# Patient Record
Sex: FEMALE | Race: WHITE | URBAN - METROPOLITAN AREA
[De-identification: names, ages, dates, MRNs, and addresses within clinical notes are randomized per-mention and may not be internally consistent; named-entity substitution may affect disease eponyms.]

---

## 2017-06-20 ENCOUNTER — HOSPITAL ENCOUNTER (OUTPATIENT)
Age: 72
Setting detail: SPECIMEN
Discharge: HOME | End: 2017-06-20
Payer: MEDICARE

## 2017-06-22 LAB — SURGICAL PATHOLOGY REPORT: NORMAL

## 2018-05-31 PROBLEM — M76.62 ACHILLES TENDINITIS OF LEFT LOWER EXTREMITY: Status: ACTIVE | Noted: 2018-05-31

## 2018-05-31 PROBLEM — M77.52: Status: ACTIVE | Noted: 2018-05-31

## 2019-05-30 ENCOUNTER — OFFICE VISIT (OUTPATIENT)
Dept: PRIMARY CARE CLINIC | Age: 74
End: 2019-05-30
Payer: MEDICARE

## 2019-05-30 VITALS
WEIGHT: 141.2 LBS | SYSTOLIC BLOOD PRESSURE: 112 MMHG | BODY MASS INDEX: 26.66 KG/M2 | DIASTOLIC BLOOD PRESSURE: 84 MMHG | OXYGEN SATURATION: 97 % | HEART RATE: 66 BPM | HEIGHT: 61 IN

## 2019-05-30 DIAGNOSIS — I10 ESSENTIAL (PRIMARY) HYPERTENSION: Primary | ICD-10-CM

## 2019-05-30 DIAGNOSIS — E78.2 MIXED HYPERLIPIDEMIA: ICD-10-CM

## 2019-05-30 PROCEDURE — 1036F TOBACCO NON-USER: CPT | Performed by: FAMILY MEDICINE

## 2019-05-30 PROCEDURE — 99214 OFFICE O/P EST MOD 30 MIN: CPT | Performed by: FAMILY MEDICINE

## 2019-05-30 PROCEDURE — G8427 DOCREV CUR MEDS BY ELIG CLIN: HCPCS | Performed by: FAMILY MEDICINE

## 2019-05-30 PROCEDURE — 1123F ACP DISCUSS/DSCN MKR DOCD: CPT | Performed by: FAMILY MEDICINE

## 2019-05-30 PROCEDURE — 4040F PNEUMOC VAC/ADMIN/RCVD: CPT | Performed by: FAMILY MEDICINE

## 2019-05-30 PROCEDURE — 1090F PRES/ABSN URINE INCON ASSESS: CPT | Performed by: FAMILY MEDICINE

## 2019-05-30 PROCEDURE — G8400 PT W/DXA NO RESULTS DOC: HCPCS | Performed by: FAMILY MEDICINE

## 2019-05-30 PROCEDURE — 3017F COLORECTAL CA SCREEN DOC REV: CPT | Performed by: FAMILY MEDICINE

## 2019-05-30 PROCEDURE — G8419 CALC BMI OUT NRM PARAM NOF/U: HCPCS | Performed by: FAMILY MEDICINE

## 2019-05-30 RX ORDER — ACETAMINOPHEN 325 MG/1
650 TABLET ORAL
COMMUNITY

## 2019-05-30 RX ORDER — AMLODIPINE BESYLATE 5 MG/1
5 TABLET ORAL DAILY
Qty: 90 TABLET | Refills: 1 | Status: SHIPPED | OUTPATIENT
Start: 2019-05-30

## 2019-05-30 ASSESSMENT — PATIENT HEALTH QUESTIONNAIRE - PHQ9
SUM OF ALL RESPONSES TO PHQ QUESTIONS 1-9: 0
1. LITTLE INTEREST OR PLEASURE IN DOING THINGS: 0
2. FEELING DOWN, DEPRESSED OR HOPELESS: 0
SUM OF ALL RESPONSES TO PHQ QUESTIONS 1-9: 0
SUM OF ALL RESPONSES TO PHQ9 QUESTIONS 1 & 2: 0

## 2019-05-30 ASSESSMENT — ENCOUNTER SYMPTOMS: SHORTNESS OF BREATH: 0

## 2019-05-30 NOTE — PROGRESS NOTES
Malgorzata Hoover a 68 y.o. female who presents today for her medical conditions/complaints as notedbelow. Chief Complaint   Patient presents with    Hypertension    Medication Refill         HPI:   Hypertension   This is a chronic problem. The current episode started more than 1 year ago. The problem is unchanged. The problem is controlled. Pertinent negatives include no chest pain, palpitations or shortness of breath. There are no associated agents to hypertension. Risk factors for coronary artery disease include post-menopausal state. Past treatments include calcium channel blockers. The current treatment provides significant improvement. There are no compliance problems. There is no history of CAD/MI. LDL Calculated (mg/dL)   Date Value   06/04/2018 65   05/04/2017 73       (goal LDL is <100)   AST (U/L)   Date Value   03/28/2014 17     ALT (U/L)   Date Value   03/28/2014 14     BUN (mg/dL)   Date Value   09/22/2016 15     BP Readings from Last 3 Encounters:   05/30/19 112/84   10/30/18 116/88   05/31/18 100/70          (goal 120/80)    Past Medical History:   Diagnosis Date    Colon cancer ([facility] [address]     Hyperlipidemia     Hypertension     Skin cancer     nose    Small bowel obstruction (HCC)     Squamous cell cancer of skin of nose 2013      Past Surgical History:   Procedure Laterality Date    BLADDER SUSPENSION      BREAST BIOPSY      CERVICAL DISCECTOMY      c-6    CERVICAL LAMINECTOMY      c-7    COLECTOMY      COLON SURGERY      resection r/t CA    CORONARY ANGIOPLASTY  09/2016    DILATION AND CURETTAGE OF UTERUS      DILATION AND CURETTAGE OF UTERUS  1979    GASTRIC STIMULATOR IMPLANT SURGERY  05/14/2016    CC for rectal incontinence    HYSTERECTOMY      RECTOCELE REPAIR      ISIDRO AND BSO  1990    TUBAL LIGATION Bilateral     VAGINA SURGERY      vault suspension       No family history on file.     Social History     Tobacco Use    Smoking status: Never Smoker    Smokeless tobacco: Never Used   Substance Use Topics    Alcohol use: Not on file      Current Outpatient Medications   Medication Sig Dispense Refill    acetaminophen (TYLENOL) 325 MG tablet Take 650 mg by mouth      amLODIPine (NORVASC) 5 MG tablet Take 1 tablet by mouth daily 90 tablet 1    omeprazole (PRILOSEC) 40 MG delayed release capsule TAKE 1 CAPSULE DAILY 90 capsule 3    simvastatin (ZOCOR) 40 MG tablet TAKE 1 TABLET NIGHTLY 90 tablet 3    meloxicam (MOBIC) 15 MG tablet TAKE 1 TABLET DAILY 90 tablet 3    aspirin 81 MG tablet Take 81 mg by mouth daily      Calcium Carbonate-Vitamin D (CALCIUM + D PO) Take 600 mg by mouth.  Cholecalciferol (D3 ADULT PO) Take  by mouth.  tiZANidine (ZANAFLEX) 2 MG tablet Take by mouth every 8 hours as needed        No current facility-administered medications for this visit. No Known Allergies    Health Maintenance   Topic Date Due    Hepatitis C screen  1945    Shingles Vaccine (2 of 3) 03/11/2011    Colon Cancer Screen FIT/FOBT  08/16/2018    Potassium monitoring  06/04/2019    Creatinine monitoring  06/04/2019    Breast cancer screen  09/26/2020    DTaP/Tdap/Td vaccine (2 - Td) 12/19/2022    Lipid screen  06/04/2023    DEXA (modify frequency per FRAX score)  Completed    Flu vaccine  Completed    Pneumococcal 65+ years Vaccine  Completed       Subjective:      Review of Systems   Respiratory: Negative for shortness of breath. Cardiovascular: Negative for chest pain and palpitations. Neurological: Negative for dizziness and light-headedness. Objective:     /84   Pulse 66   Ht 5' 0.5\" (1.537 m)   Wt 141 lb 3.2 oz (64 kg)   SpO2 97%   BMI 27.12 kg/m²   Physical Exam   Constitutional: She is oriented to person, place, and time. She appears well-developed and well-nourished. No distress. HENT:   Head: Normocephalic and atraumatic.    Right Ear: External ear normal.   Left Ear: External ear normal.   Mouth/Throat: Oropharynx is clear and moist.   Eyes: Pupils are equal, round, and reactive to light. Conjunctivae are normal. Right eye exhibits no discharge. Left eye exhibits no discharge. No scleral icterus. Neck: No tracheal deviation present. No thyromegaly present. Cardiovascular: Normal rate, regular rhythm, normal heart sounds and intact distal pulses. No murmur heard. No carotid bruits   Pulmonary/Chest: Effort normal and breath sounds normal. No respiratory distress. She has no wheezes. Musculoskeletal: She exhibits no edema. Lymphadenopathy:     She has no cervical adenopathy. Neurological: She is alert and oriented to person, place, and time. Skin: Skin is warm. Capillary refill takes less than 2 seconds. No rash noted. Psychiatric: She has a normal mood and affect. Her behavior is normal. Thought content normal.   Nursing note and vitals reviewed. Assessment:       Diagnosis Orders   1. Essential (primary) hypertension     2. Mixed hyperlipidemia          Plan:      Return if symptoms worsen or fail to improve. Due for labs   Needs to find a new doctor in [address]  Patient given her main infor and last labs. No orders of the defined types were placed in this encounter. Orders Placed This Encounter   Medications    amLODIPine (NORVASC) 5 MG tablet     Sig: Take 1 tablet by mouth daily     Dispense:  90 tablet     Refill:  1       Patient given educational materials - see patient instructions. Discussed use, benefit, and side effects of prescribed medications. All patient questions answered. Pt voiced understanding. Reviewed health maintenance. Instructed to continue current medications, diet and exercise. Patient agreed with treatment plan. Follow up as directed.      Electronicallysigned by Omar Xie MD on 5/30/2019 at 11:57 AM

## 2020-08-25 ENCOUNTER — HOSPITAL ENCOUNTER (OUTPATIENT)
Dept: PHYSICAL THERAPY | Age: 75
Discharge: HOME | End: 2020-08-25
Payer: MEDICARE

## 2020-08-25 PROCEDURE — 97161 PT EVAL LOW COMPLEX 20 MIN: CPT

## 2020-08-25 PROCEDURE — 97110 THERAPEUTIC EXERCISES: CPT

## 2020-08-25 NOTE — THERAPY EVALUATION
Karuna Bull  : 1945  [place]  Secondary: [address] [facility]  [address] , [address], [place] [address]  [phone]   [phone]        OUTPATIENT PHYSICAL THERAPY:Initial Assessment 2020   ICD-10: Treatment Diagnosis: R15.9 Full incontinence of feces (Fecal incontinence NOS), R27.8 Lack of coordination (muscle incoordination), R39.11 Hesitancy of micturition, M62.81 Generalized weakness  Precautions/Allergies:   Patient has no allergy information on record. TREATMENT PLAN:  Effective Dates: 2020 TO 2020 (90 days). Frequency/Duration: 1 time a week for 90 Day(s) MEDICAL/REFERRING DIAGNOSIS:  Pelvic Floor   DATE OF ONSET: chronic  REFERRING PHYSICIAN: Julia Hart MD MD Orders: Evaluate and treat  Return MD Appointment: 2021     INITIAL ASSESSMENT:  Ms. Ariane Hercules presents with both weak and overactive pelvic floor muscle (PFM), resulting in fecal incontinence. She also demonstrates weakness throughout her core musculature and pelvic girdle which could be contributing to further PFM dysfunction. She has a good coordination of contraction, relaxation and bulge of PFM, however sensory awareness with full rectum is decreased. This could indicate a defect of the internal anal sphincter contributing to [address] as well. I believe she will benefit from skilled PT, with an emphasis on manual therapy and muscle retraining, strength, and coordination to improve her bowel control. She is pleasant, motivated and eager to return to her prior level of function. PROBLEM LIST (Impacting functional limitations):  1. Decreased Strength  2. Decreased ADL/Functional Activities  3. Decreased [place] with Home Exercise Program  4. Decreased timing, awareness and coordination   5. Decreased sensory awareness INTERVENTIONS PLANNED: (Treatment may consist of any combination of the following)  1.  Neuromuscular re-education  2. Biofeedback as needed  3. HEP  4. Bladder retraining  5. Bladder education  6. Electrical Stimulation  7. Home Exercise Program (HEP)  8. Manual Therapy  9. Neuromuscular Re-education/Strengthening  10. Therapeutic Activites  11. Therapeutic Exercise/Strengthening     GOALS: (Goals have been discussed and agreed upon with patient.)  Short-Term Functional Goals: Time Frame: 6 weeks  1. Pt will demonstrate I with basic PFM HEP to improve awareness, coordination, and timing of PFM. 2. Pt will independently perform proper toilet position to improve bowel and bladder emptying. 3. Pt will be independent with a bowel routine in order to improve bowel regularity. Discharge Goals: Time Frame: 12 weeks  1. Pt will be reports a reduction in episodes of FI to < 2-3 per week. 2. Pt will increased external anal sphincter squeeze pressure from 1/5 to 3/5 in order to reduce pad usage by 1-2 PPD. 3. Pt will improve outcome score by 10%. 4. Pt will demonstrate I with advanced core stabilization and general mobility program to facilitate carry over and I management of symptoms. OUTCOME MEASURE:   Tool Used: Pelvic Floor Impact Questionnaire--short form 7 (PFIQ-7)   Score:  Initial: 8/25/20  · Bladder or Urine: 0/100  · Bowel or Rectum: 33/100  · Vagina or Pelvis: 0/100 Most Recent: X (Date: -- )  · Bladder or Urine: X  · Bowel or Rectum: X  · Vagina or Pelvis: X   Interpretation of Score: Each of the 7 sections is scored on a scale from 0-3; 0 representing \"Not at all\", 3 representing \"Quite a bit\". The mean value is taken from all the answered items, then multiplied by 100 to obtain the scale score, ranging from 0-100. This process is repeated for each column representing bowel, bladder, and pelvic pain.     MEDICAL NECESSITY:   · Patient is expected to demonstrate progress in strength, range of motion, coordination and functional technique to improve bowel control and reduce FI.  REASON FOR SERVICES/OTHER COMMENTS:  · Patient continues to require skilled intervention due to above mentioned deficits. Total Duration:  PT Patient Time In/Time Out  Time In: 1500  Time Out: 1600    Rehabilitation Potential For Stated Goals: [facility], I certify that the treatment plan above will be carried out by a therapist or under their direction. Thank you for this referral,  Martin Morelos, PT, DPT     Referring Physician Signature: Katheryn Stout MD _______________________________ Date _____________     PAIN/SUBJECTIVE:   Initial: Pain Intensity 1: 0  Post Session:  0/10   HISTORY:   History of Injury/Illness (Reason for Referral):  Ms. Amie Amaya is a 76 yo female referred to pelvic floor physical therapy (PFPT) by Katheryn Stout MD 2/2 fecal incontinence. This is a chronic problem and started in 2000. Had interstim placed for FI about 4.5 years ago at [facility]. Other previous treatment include OTC medications. FI happens 5 days a week 2-3x/day, happens more in AM. Stool is firm and formed. Incontinence happens without awareness. She does have a history of colon cancer. Had surgery for this to remove part of large intestine. Urinary: Frequency 4-5 x/day, 1 x/night. Positive for urinary hesitancy/intermittency. Negative for stress urinary incontinence, urge urinary incontinence, urinary urgency, urinary frequency, incomplete emptying, dysuria, hematuria, nocturia and nocturnal enuresis. Pt does not use pads for urinary protection; 0 pads per day (PPD). Fluid intake: 2c water/day; bladder irritants include: 2c decaf coffee, 4x/weeks 2c decaf tea. Pt does not limit fluid intake due to bladder control. Bowel: Frequency 3-4x/day. Positive for fecal incontinence, incomplete emptying and decrease urgency/sensation. Negative for pain with bowel movement, pushing/straining with bowel movement, fecal incontinence and constipation.  Pt does use pads for bowel protection; 3 pads per day (PPD). [place] stool type: 3. Use of stool softeners or laxatives? NO; take fibercon daily  Sexual: Pt is not sexually active with male partners. Pelvic Organ Prolapse/Pelvic Pain: denies pain/ pressure complaints. OB History: Number of pregnancies: 2 Number of vaginal deliveries: 2 Number of c-sections: 0. Past Medical History/Comorbidities:   Ms. Jose Mullins  has no past medical history on file. Ms. Jose Mullins  has no past surgical history on file. Social History/Living Environment:   Pt lives with her . Alcohol use? no  Tobacco use? no  Sexual abuse? no  Prior Level of Function/Work/Activity:  Prior level of function: independent, FI is a chronic issue  Occupation: Retired;   Exercise activities: none     Ambulatory/Rehab [address] [place] [address] Risk Assessment   Risk Factors:       No Risk Factors Identified Ability to Rise from Chair:       (0)  Ability to rise in a single movement   Falls Prevention Plan:       No modifications necessary   Total: (5 or greater = High Risk): 0   ©2010 [facility] [address] [place] [address] States Patent #[id number].  Federal Law prohibits the replication, distribution or use without written permission from [email or web address]   Current Medications:     - amlodipine  - simvastatin, 40 mg  - omepresole  - meloxicam, 15 mg  - fibercon   Date Last Reviewed: 8/25/2020   Number of Personal Factors/Comorbidities that affect the Plan of Care: 0: LOW COMPLEXITY   EXAMINATION:   External Observations:   Voluntary contraction: [] absent     [x] present   Involuntary contraction: [] absent     [x] present   Involuntary relaxation: [] absent     [x] present   Perineal descent at rest: [x] absent     [] present   Perineal descent with bearing: [] absent     [x] present   Skin integrity: mild skin irritation around anus, decreased puckering of EAS    Pelvic Floor Muscle (PFM) Assessment:   Muscle volume: [] decreased     [x] WNL     [] Defect   PFM tension: [] decreased     [x] increased     [] WNL    Contraction ability:  Voluntary contraction: [] absent     [x] weak     [] moderate      [] strong  Voluntary relaxation: [] absent     [x] partial     [] complete   MMT: 1/5   Quality of contractions: Weak EAS with overactive puborectalis  Overflow: [x] absent     [] min     [] mod     [] severe    Palpation: via rectal canal ; non tender throughout, moderate tension of levator ani, especially puborectalis. Body Structures Involved:  1. Nerves  2. Digestive Structures  3. Muscles Body Functions Affected:  1. Sensory/Pain  2. Neuromusculoskeletal  3. Movement Related  4. Digestive Activities and Participation Affected:  1. Learning and Applying Knowledge  2. General Tasks and Demands  3. Mobility  4.  Self Care   Number of elements (examined above) that affect the Plan of Care: 3: MODERATE COMPLEXITY   CLINICAL PRESENTATION:   Presentation: Stable and uncomplicated: LOW COMPLEXITY   CLINICAL DECISION MAKING:   Use of outcome tool(s) and clinical judgement create a POC that gives a: Questionable prediction of patient's progress: MODERATE COMPLEXITY

## 2020-08-26 NOTE — PROGRESS NOTES
Guerline Merrill  : 1945  [place]  Secondary: [address] [facility]  [address] , [address], [place] [address]  [phone]   [phone]      OUTPATIENT PHYSICAL THERAPY: Daily Treatment Note 2020   Visit Count:  1  ICD-10: Treatment Diagnosis: R15.9 Full incontinence of feces (Fecal incontinence NOS), R27.8 Lack of coordination (muscle incoordination), R39.11 Hesitancy of micturition, M62.81 Generalized weakness  Precautions/Allergies:   Patient has no allergy information on record. TREATMENT PLAN:  Effective Dates: 2020 TO 2020 (90 days). Frequency/Duration: 1 time a week for 90 Day(s) MEDICAL/REFERRING DIAGNOSIS:  Pelvic Floor   DATE OF ONSET: chronic  REFERRING PHYSICIAN: Ana Rosa Jones MD MD Orders: Evaluate and treat  Return MD Appointment: 2021     Pre-treatment Symptoms/Complaints:  Fecal incontinence without awareness  Pain: Initial: Pain Intensity 1: 0  Post Session:  0/10   Medications Last Reviewed:  2020  Updated Objective Findings:  See evaluation note from today   TREATMENT:   THERAPEUTIC EXERCISE: (10 minutes):  Exercises per grid below to improve mobility and coordination. Required minimal verbal cues to improve activation and relaxation. Progressed range and repetitions as indicated. TE= therapeutic exercise, TA= therapeutic activity, MT= manual therapy, NE= neuro re-education   Date:  20 Date:   Date:     Activity/Exercise Parameters Parameters Parameters   PFM coordination Kegel; isolation of contraction and relaxation (complete) to improve puborectalis mobility/tension     Toilet positioning 5 minutes                                     Pt gives verbal consent to internal rectal assessment/treatment without chaperon present.     Treatment/Session Summary:    · Response to Treatment:  Pt reports good understanding of plan of care, as well as prescribed home exercise program.  All questions were answered to pt's satisfaction. Pt was invited to call with any further questions or concerns. · Communication/Consultation:  None today  · Equipment provided today:  None today  · Recommendations/Intent for next treatment session: Next visit will focus on biofeedback, check in toilet positioning, EAS-stim?/winks.   Total Treatment Billable Duration: Evaluation 40 minutes, treatment 10 minutes  PT Patient Time In/Time Out  Time In: 1500  Time Out: 1600  Ally Leigh PT, DPT    Future Appointments   Date Time Provider Lenny Browni   9/2/2020  1:00 PM Davis Sandoval PT, DPT [facility]   9/9/2020  1:00 PM [facility]   9/16/2020  1:00 PM Zackary Hooper PT, DPT [facility]   9/23/2020  2:45 PM Zackary Hooper PT, DPT [facility]   9/30/2020  1:00 PM Davis Sandoval PT, DPT [facility]   10/7/2020  1:00 PM [facility]oper PT, [facility]

## 2020-09-02 ENCOUNTER — HOSPITAL ENCOUNTER (OUTPATIENT)
Dept: PHYSICAL THERAPY | Age: 75
Discharge: HOME | End: 2020-09-02
Payer: MEDICARE

## 2020-09-02 PROCEDURE — 97110 THERAPEUTIC EXERCISES: CPT

## 2020-09-02 PROCEDURE — 97530 THERAPEUTIC ACTIVITIES: CPT

## 2020-09-02 NOTE — PROGRESS NOTES
Rivka Sandra  : 1945  [place]  Secondary: [address] [facility]  [address] , [address], [place] [address]  [phone]   [phone]      OUTPATIENT PHYSICAL THERAPY: Daily Treatment Note 2020   Visit Count:  2  ICD-10: Treatment Diagnosis: R15.9 Full incontinence of feces (Fecal incontinence NOS), R27.8 Lack of coordination (muscle incoordination), R39.11 Hesitancy of micturition, M62.81 Generalized weakness  Precautions/Allergies:   Patient has no allergy information on record. TREATMENT PLAN:  Effective Dates: 2020 TO 2020 (90 days). Frequency/Duration: 1 time a week for 90 Day(s) MEDICAL/REFERRING DIAGNOSIS:  Pelvic Floor   DATE OF ONSET: chronic  REFERRING PHYSICIAN: Rosibel Jordan MD MD Orders: Evaluate and treat  Return MD Appointment: 2021     Pre-treatment Symptoms/Complaints: Pt had increased constipation for a few days after the evaluation. She did not have any leakage those days. She did have to travel to [facility] for a .  Pain: Initial: Pain Intensity 1: 0  Post Session:  0/10   Medications Last Reviewed:  2020  Updated Objective Findings:  None Today   TREATMENT:   THERAPEUTIC ACTIVITY: ( 10 minutes): Therapeutic activities per grid below to improve understand of bowel health and stool consistency in FI. Required moderate verbal cues to understanding and compliance. THERAPEUTIC EXERCISE: (45 minutes):  Exercises per grid below to improve mobility, strength and coordination. Required minimal verbal cues to improve activation, strength and endurance. Progressed range and repetitions as indicated.   TE= therapeutic exercise, TA= therapeutic activity, MT= manual therapy, NE= neuro re-education   Date:  20 Date:  20 Date:     Activity/Exercise Parameters Parameters Parameters   PFM coordination Kegel; isolation of contraction and relaxation (complete) to improve puborectalis mobility/tension PFM coordination w/ sEMG to improve endurance and strength; anal wink w/ sEMG and manual palpation- 45 minutes    Toilet positioning 5 minutes     Bowel health  Role of bowel health and stool consistency in FI- 10 minutes                              Pt gives verbal consent to internal rectal assessment/treatment without chaperon present. Treatment/Session Summary:    · Response to Treatment:  Pt with improve relaxation of puborectalis today, only mild tension noted. She does demonstrate weakness of EAS likely contributing to FI. Pt reports no sensation or awareness with FI, which makes me wondering if there is IAS involvement/compromise. Per patient, she has not had any testing or imaging done, only an x-ray to check the placement of her interstim which was good. Anal winks were initiated today to begin to improve EAS strength; pt has difficult isolating this. Will continue with kegel endurance holds of 6 seconds and anal winks for HEP this week. · Communication/Consultation:  None today  · Equipment provided today:  None today  · Recommendations/Intent for next treatment session: Next visit will focus on EAS-stim/winks, PFM strength, glute strength.   Total Treatment Billable Duration: 45 minutes TE, 10 minutes TA  PT Patient Time In/Time Out  Time In: 1300  Time Out: Jyotsna Schwartz 1620, PT, DPT    Future Appointments   Date Time Provider Lenny Perdomo   9/16/2020  1:00 PM Rodney Braun, PT, DPT [facility]   9/23/2020  2:45 PM Ronny Hooper PT, DPT [facility]   9/30/2020  1:00 PM [facility]   10/7/2020  1:00 PM Ronny Hooper PT, DPT [facility]

## 2020-09-09 ENCOUNTER — HOSPITAL ENCOUNTER (OUTPATIENT)
Dept: PHYSICAL THERAPY | Age: 75
Discharge: HOME | End: 2020-09-09
Payer: MEDICARE

## 2020-09-09 PROCEDURE — 97530 THERAPEUTIC ACTIVITIES: CPT

## 2020-09-09 PROCEDURE — 97110 THERAPEUTIC EXERCISES: CPT

## 2020-09-09 NOTE — PROGRESS NOTES
Alphonso Crane  : 1945  [place]  Secondary: [address] to [facility]  [address] , [address], [place] [address]  [phone]   [phone]      OUTPATIENT PHYSICAL THERAPY: Daily Treatment Note 2020   Visit Count:  3  ICD-10: Treatment Diagnosis: R15.9 Full incontinence of feces (Fecal incontinence NOS), R27.8 Lack of coordination (muscle incoordination), R39.11 Hesitancy of micturition, M62.81 Generalized weakness  Precautions/Allergies:   Patient has no allergy information on record. TREATMENT PLAN:  Effective Dates: 2020 TO 2020 (90 days). Frequency/Duration: 1 time a week for 90 Day(s) MEDICAL/REFERRING DIAGNOSIS:  Pelvic Floor   DATE OF ONSET: chronic  REFERRING PHYSICIAN: Ana Rosa Baldwin MD MD Orders: Evaluate and treat  Return MD Appointment: 2021     Pre-treatment Symptoms/Complaints: Pt reports FI was better this week until today. She thinks that she ate poorly over the weekend and is having looser stool today with more leakage. Pain: Initial: Pain Intensity 1: 0  Post Session:  0/10   Medications Last Reviewed:  2020  Updated Objective Findings:  None Today   TREATMENT:   THERAPEUTIC ACTIVITY: ( 10 minutes): Therapeutic activities per grid below to improve rectal angle and positioning. Required moderate verbal cues to improve bowel evacuation. THERAPEUTIC EXERCISE: (35 minutes):  Exercises per grid below to improve mobility, strength and coordination. Required minimal verbal cues to improve activation, strength and endurance. Progressed range and repetitions as indicated.   TE= therapeutic exercise, TA= therapeutic activity, MT= manual therapy, NE= neuro re-education   Date:  20 Date:  20 Date:  20   Activity/Exercise Parameters Parameters Parameters   PFM coordination Kegel; isolation of contraction and relaxation (complete) to improve puborectalis mobility/tension PFM coordination w/ sEMG to improve endurance and strength; anal wink w/ sEMG and manual palpation- 45 minutes Coordination of contraction and relaxation with manual palpation and e-stim to improve mm recruitment with voluntary activation - 35 minutes   Toilet positioning 5 minutes  Splinting to improve bowel evacuation; edu on prolapse and stool trapping; Release device to improve emptying- 10 minutes   Bowel health  Role of bowel health and stool consistency in FI- 10 minutes                              Pt gives verbal consent to internal rectal assessment/treatment without chaperon present. Treatment/Session Summary:    · Response to Treatment:  Pt with possible mild rectocele upon vaginal examination. This could be causing stool trapping, especially with looser stool causing incomplete emptying. We discussed splinting in order to improve support in order to improve elimination. She was provided the name of the \"Release\" tool to improve splinting as well. E-stim to EAS today with active voluntary recruitment of EAS to improve strength and endurance. · Communication/Consultation:  None today  · Equipment provided today:  None today  · Recommendations/Intent for next treatment session: Next visit will focus on EAS-stim/winks, PFM strength, glute strength.   Total Treatment Billable Duration: 35 minutes TE, 10 minutes TA  PT Patient Time In/Time Out  Time In: 1310  Time Out: Jyotsna Schwartz 1620, PT, DPT    Future Appointments   Date Time Provider Lenny Perdomo   9/16/2020  1:00 PM Mita Farley, PT, DPT [facility]   9/23/2020  2:45 PM [facility]oper, [facility], [facility]   9/30/2020  1:00 PM Mita Farley, PT, DPT [facility]   10/7/2020  1:00 PM Garfield Hooper, PT, [facility]

## 2020-09-23 ENCOUNTER — APPOINTMENT (OUTPATIENT)
Dept: PHYSICAL THERAPY | Age: 75
End: 2020-09-23
Payer: MEDICARE

## 2020-09-30 ENCOUNTER — APPOINTMENT (OUTPATIENT)
Dept: PHYSICAL THERAPY | Age: 75
End: 2020-09-30
Payer: MEDICARE

## 2020-10-07 ENCOUNTER — APPOINTMENT (OUTPATIENT)
Dept: PHYSICAL THERAPY | Age: 75
End: 2020-10-07

## 2022-05-13 PROBLEM — I10 PRIMARY HYPERTENSION: Status: ACTIVE | Noted: 2022-05-13

## 2022-05-13 PROBLEM — E78.5 DYSLIPIDEMIA: Status: ACTIVE | Noted: 2022-05-13

## 2022-05-13 PROBLEM — I51.7 LVH (LEFT VENTRICULAR HYPERTROPHY): Status: ACTIVE | Noted: 2022-05-13

## 2022-11-27 ASSESSMENT — ENCOUNTER SYMPTOMS
ABDOMINAL PAIN: 0
ABDOMINAL DISTENTION: 0
SORE THROAT: 0
PHOTOPHOBIA: 0
SHORTNESS OF BREATH: 0
CONSTIPATION: 0
DIARRHEA: 0

## 2022-11-28 NOTE — PROGRESS NOTES
right atrial pressure is normal (~3 mmHg). IVC size is normal.   Pericardium No pericardial effusion. Past Medical History, Past Surgical History, Family history, Social History, and Medications were all reviewed with the patient today and updated as necessary. Current Outpatient Medications   Medication Sig Dispense Refill    amLODIPine (NORVASC) 10 MG tablet Take 10 mg by mouth daily      Meloxicam 15 MG TBDP Take by mouth      simvastatin (ZOCOR) 20 MG tablet Take 20 mg by mouth nightly      Cholecalciferol (VITAMIN D3) 1.25 MG (47220 UT) CAPS Take by mouth      aspirin 81 MG EC tablet Take 81 mg by mouth daily      calcium carbonate 600 MG TABS tablet Take 1 tablet by mouth daily      psyllium (KONSYL) 28.3 % PACK Take 1 packet by mouth daily       No current facility-administered medications for this visit. No Known Allergies    Patient Active Problem List    Diagnosis Date Noted    Primary hypertension 05/13/2022    Dyslipidemia 05/13/2022    LVH (left ventricular hypertrophy) 05/13/2022         Social History     Tobacco Use    Smoking status: Never    Smokeless tobacco: Never   Substance Use Topics    Alcohol use: Not on file       ROS:    Review of Systems   Constitutional:  Negative for appetite change, chills, diaphoresis and fatigue. HENT:  Negative for congestion, mouth sores, nosebleeds, sore throat and tinnitus. Eyes:  Negative for photophobia and visual disturbance. Respiratory:  Positive for cough. Negative for shortness of breath. Cardiovascular:  Negative for chest pain, palpitations and leg swelling. Gastrointestinal:  Negative for abdominal distention, abdominal pain, constipation and diarrhea. Endocrine: Negative for cold intolerance, heat intolerance, polydipsia and polyuria. Genitourinary:  Negative for dysuria and hematuria. Musculoskeletal:  Negative for arthralgias, joint swelling and myalgias. Skin:  Negative for rash.    Allergic/Immunologic: Negative for environmental allergies and food allergies. Neurological:  Negative for dizziness, seizures, syncope and light-headedness. Hematological:  Negative for adenopathy. Does not bruise/bleed easily. Psychiatric/Behavioral:  Negative for agitation, behavioral problems, dysphoric mood and hallucinations. The patient is not nervous/anxious. PHYSICAL EXAM:     Vitals:    11/29/22 1349   BP: 122/78   Pulse: 78   Weight: 144 lb (65.3 kg)   Height: 5' (1.524 m)      Wt Readings from Last 3 Encounters:   11/29/22 144 lb (65.3 kg)   11/04/22 140 lb (63.5 kg)   05/13/22 140 lb (63.5 kg)      BP Readings from Last 3 Encounters:   11/29/22 122/78   11/04/22 130/70   05/13/22 122/60        Physical Exam  Constitutional:       Appearance: Normal appearance. She is normal weight. HENT:      Head: Normocephalic and atraumatic. Nose: Nose normal.      Mouth/Throat:      Mouth: Mucous membranes are moist.      Pharynx: Oropharynx is clear. Eyes:      Extraocular Movements: Extraocular movements intact. Pupils: Pupils are equal, round, and reactive to light. Neck:      Vascular: No carotid bruit or JVD. Cardiovascular:      Rate and Rhythm: Normal rate and regular rhythm. Heart sounds: No murmur heard. No friction rub. No gallop. Pulmonary:      Effort: Pulmonary effort is normal.      Breath sounds: Normal breath sounds. No wheezing or rhonchi. Abdominal:      General: Abdomen is flat. Bowel sounds are normal. There is no distension. Palpations: Abdomen is soft. Tenderness: There is no abdominal tenderness. Musculoskeletal:         General: No swelling. Normal range of motion. Cervical back: Normal range of motion and neck supple. No tenderness. Skin:     General: Skin is warm and dry. Neurological:      General: No focal deficit present. Mental Status: She is alert and oriented to person, place, and time. Mental status is at baseline.    Psychiatric: Mood and Affect: Mood normal.         Behavior: Behavior normal.        Medical problems and test results were reviewed with the patient today. No results found for: CHOL  No results found for: TRIG  No results found for: HDL  No results found for: LDLCHOLESTEROL, LDLCALC  No results found for: LABVLDL, VLDL  No results found for: CHOLHDLRATIO     No results found for: NA, K, CL, CO2, BUN, CREATININE, GLUCOSE, CALCIUM      No results for input(s): WBC, HGB, HCT, MCV, PLT in the last 720 hours. No results found for: LABA1C  No results found for: EAG     No results found for: BNP     No results found for: TSHFT4, TSH     No results found for any visits on 11/29/22. ASSESSMENT and PLAN    Diagnoses and all orders for this visit:      1. Primary hypertension- stable, continue low salt diet, exercise as tolerated, continue present meds, lose 10lbs by next visit. 2. Dyslipidemia- stable, continue meds with PCP surveillance. 3. LVH (left ventricular hypertrophy)-no LVH on echo. Doing great with no symptoms of diastolic failure, no angina, no palpitations significant from a hemodynamic standpoint. Follow clinically for now. Low-sodium diet, exercise, vital signs excellent. Follow clinically for now. Return in about 1 year (around 11/29/2023).          Jair Dove MD  11/29/2022  2:03 PM

## 2022-11-29 ENCOUNTER — OFFICE VISIT (OUTPATIENT)
Dept: CARDIOLOGY CLINIC | Age: 77
End: 2022-11-29
Payer: MEDICARE

## 2022-11-29 VITALS
BODY MASS INDEX: 28.27 KG/M2 | HEART RATE: 78 BPM | WEIGHT: 144 LBS | DIASTOLIC BLOOD PRESSURE: 78 MMHG | HEIGHT: 60 IN | SYSTOLIC BLOOD PRESSURE: 122 MMHG

## 2022-11-29 DIAGNOSIS — I10 PRIMARY HYPERTENSION: ICD-10-CM

## 2022-11-29 DIAGNOSIS — E78.5 DYSLIPIDEMIA: ICD-10-CM

## 2022-11-29 DIAGNOSIS — I51.7 LVH (LEFT VENTRICULAR HYPERTROPHY): Primary | ICD-10-CM

## 2022-11-29 PROCEDURE — 1036F TOBACCO NON-USER: CPT | Performed by: INTERNAL MEDICINE

## 2022-11-29 PROCEDURE — 3078F DIAST BP <80 MM HG: CPT | Performed by: INTERNAL MEDICINE

## 2022-11-29 PROCEDURE — G8419 CALC BMI OUT NRM PARAM NOF/U: HCPCS | Performed by: INTERNAL MEDICINE

## 2022-11-29 PROCEDURE — 1123F ACP DISCUSS/DSCN MKR DOCD: CPT | Performed by: INTERNAL MEDICINE

## 2022-11-29 PROCEDURE — G8484 FLU IMMUNIZE NO ADMIN: HCPCS | Performed by: INTERNAL MEDICINE

## 2022-11-29 PROCEDURE — G8400 PT W/DXA NO RESULTS DOC: HCPCS | Performed by: INTERNAL MEDICINE

## 2022-11-29 PROCEDURE — 1090F PRES/ABSN URINE INCON ASSESS: CPT | Performed by: INTERNAL MEDICINE

## 2022-11-29 PROCEDURE — 3074F SYST BP LT 130 MM HG: CPT | Performed by: INTERNAL MEDICINE

## 2022-11-29 PROCEDURE — 99214 OFFICE O/P EST MOD 30 MIN: CPT | Performed by: INTERNAL MEDICINE

## 2022-11-29 PROCEDURE — G8427 DOCREV CUR MEDS BY ELIG CLIN: HCPCS | Performed by: INTERNAL MEDICINE

## 2022-11-29 RX ORDER — PHENOL 1.4 %
1 AEROSOL, SPRAY (ML) MUCOUS MEMBRANE DAILY
COMMUNITY

## 2022-11-29 RX ORDER — AMLODIPINE BESYLATE 10 MG/1
10 TABLET ORAL DAILY
COMMUNITY

## 2022-11-29 RX ORDER — SIMVASTATIN 20 MG
20 TABLET ORAL NIGHTLY
COMMUNITY

## 2022-11-29 RX ORDER — CHOLECALCIFEROL (VITAMIN D3) 1250 MCG
CAPSULE ORAL
COMMUNITY

## 2022-11-29 RX ORDER — ASPIRIN 81 MG/1
81 TABLET ORAL DAILY
COMMUNITY

## 2022-11-29 ASSESSMENT — ENCOUNTER SYMPTOMS: COUGH: 1

## 2023-06-13 ENCOUNTER — HOSPITAL ENCOUNTER (OUTPATIENT)
Age: 78
Setting detail: OBSERVATION
Discharge: HOME | End: 2023-06-15
Attending: INTERNAL MEDICINE | Admitting: INTERNAL MEDICINE
Payer: MEDICARE

## 2023-06-13 ENCOUNTER — APPOINTMENT (OUTPATIENT)
Dept: NON INVASIVE DIAGNOSTICS | Age: 78
End: 2023-06-13
Attending: INTERNAL MEDICINE
Payer: MEDICARE

## 2023-06-13 DIAGNOSIS — R00.1 BRADYCARDIA: ICD-10-CM

## 2023-06-13 DIAGNOSIS — R00.1 SYMPTOMATIC BRADYCARDIA: Primary | ICD-10-CM

## 2023-06-13 PROBLEM — I10 PRIMARY HYPERTENSION: Status: ACTIVE | Noted: 2022-05-13

## 2023-06-13 PROBLEM — R53.1 GENERALIZED WEAKNESS: Status: ACTIVE | Noted: 2023-06-13

## 2023-06-13 PROBLEM — E78.5 DYSLIPIDEMIA: Status: ACTIVE | Noted: 2022-05-13

## 2023-06-13 LAB
ALBUMIN SERPL-MCNC: 3.8 G/DL (ref 3.2–4.6)
ALBUMIN/GLOB SERPL: 1.3 (ref 0.4–1.6)
ALP SERPL-CCNC: 52 U/L (ref 50–136)
ALT SERPL-CCNC: 26 U/L (ref 12–65)
ANION GAP SERPL CALC-SCNC: 3 MMOL/L (ref 2–11)
AST SERPL-CCNC: 16 U/L (ref 15–37)
BILIRUB SERPL-MCNC: 0.5 MG/DL (ref 0.2–1.1)
BUN SERPL-MCNC: 18 MG/DL (ref 8–23)
CALCIUM SERPL-MCNC: 9.2 MG/DL (ref 8.3–10.4)
CHLORIDE SERPL-SCNC: 111 MMOL/L (ref 101–110)
CO2 SERPL-SCNC: 27 MMOL/L (ref 21–32)
CREAT SERPL-MCNC: 1 MG/DL (ref 0.6–1)
ECHO AO ASC DIAM: 3.2 CM
ECHO AO ASCENDING AORTA INDEX: 1.96 CM/M2
ECHO AO ROOT DIAM: 2.9 CM
ECHO AO ROOT INDEX: 1.78 CM/M2
ECHO AV AREA PEAK VELOCITY: 2.4 CM2
ECHO AV AREA VTI: 2.4 CM2
ECHO AV AREA/BSA PEAK VELOCITY: 1.5 CM2/M2
ECHO AV AREA/BSA VTI: 1.5 CM2/M2
ECHO AV MEAN GRADIENT: 4 MMHG
ECHO AV MEAN VELOCITY: 0.9 M/S
ECHO AV PEAK GRADIENT: 7 MMHG
ECHO AV PEAK VELOCITY: 1.4 M/S
ECHO AV VELOCITY RATIO: 0.93
ECHO AV VTI: 31.1 CM
ECHO BSA: 1.68 M2
ECHO EST RA PRESSURE: 3 MMHG
ECHO IVC PROX: 2 CM
ECHO LA AREA 2C: 19.9 CM2
ECHO LA AREA 4C: 18.6 CM2
ECHO LA DIAMETER INDEX: 2.02 CM/M2
ECHO LA DIAMETER: 3.3 CM
ECHO LA MAJOR AXIS: 6.3 CM
ECHO LA MINOR AXIS: 6.1 CM
ECHO LA TO AORTIC ROOT RATIO: 1.14
ECHO LA VOL 2C: 54 ML (ref 22–52)
ECHO LA VOL 4C: 45 ML (ref 22–52)
ECHO LA VOL BP: 50 ML (ref 22–52)
ECHO LA VOL/BSA BIPLANE: 31 ML/M2 (ref 16–34)
ECHO LA VOLUME INDEX A2C: 33 ML/M2 (ref 16–34)
ECHO LA VOLUME INDEX A4C: 28 ML/M2 (ref 16–34)
ECHO LV E' LATERAL VELOCITY: 12 CM/S
ECHO LV E' SEPTAL VELOCITY: 9 CM/S
ECHO LV EDV A2C: 85 ML
ECHO LV EDV A4C: 79 ML
ECHO LV EDV INDEX A4C: 48 ML/M2
ECHO LV EDV NDEX A2C: 52 ML/M2
ECHO LV EJECTION FRACTION A2C: 63 %
ECHO LV EJECTION FRACTION A4C: 69 %
ECHO LV EJECTION FRACTION BIPLANE: 67 % (ref 55–100)
ECHO LV ESV A2C: 31 ML
ECHO LV ESV A4C: 24 ML
ECHO LV ESV INDEX A2C: 19 ML/M2
ECHO LV ESV INDEX A4C: 15 ML/M2
ECHO LV FRACTIONAL SHORTENING: 33 % (ref 28–44)
ECHO LV INTERNAL DIMENSION DIASTOLE INDEX: 2.58 CM/M2
ECHO LV INTERNAL DIMENSION DIASTOLIC: 4.2 CM (ref 3.9–5.3)
ECHO LV INTERNAL DIMENSION SYSTOLIC INDEX: 1.72 CM/M2
ECHO LV INTERNAL DIMENSION SYSTOLIC: 2.8 CM
ECHO LV IVSD: 0.9 CM (ref 0.6–0.9)
ECHO LV MASS 2D: 109.8 G (ref 67–162)
ECHO LV MASS INDEX 2D: 67.4 G/M2 (ref 43–95)
ECHO LV POSTERIOR WALL DIASTOLIC: 0.8 CM (ref 0.6–0.9)
ECHO LV RELATIVE WALL THICKNESS RATIO: 0.38
ECHO LVOT AREA: 2.5 CM2
ECHO LVOT AV VTI INDEX: 0.95
ECHO LVOT DIAM: 1.8 CM
ECHO LVOT MEAN GRADIENT: 3 MMHG
ECHO LVOT PEAK GRADIENT: 6 MMHG
ECHO LVOT PEAK VELOCITY: 1.3 M/S
ECHO LVOT STROKE VOLUME INDEX: 46.2 ML/M2
ECHO LVOT SV: 75.3 ML
ECHO LVOT VTI: 29.6 CM
ECHO MV A VELOCITY: 1.03 M/S
ECHO MV AREA VTI: 2.1 CM2
ECHO MV E DECELERATION TIME (DT): 203 MS
ECHO MV E VELOCITY: 1 M/S
ECHO MV E/A RATIO: 0.97
ECHO MV E/E' LATERAL: 8.33
ECHO MV E/E' RATIO (AVERAGED): 9.72
ECHO MV E/E' SEPTAL: 11.11
ECHO MV LVOT VTI INDEX: 1.23
ECHO MV MAX VELOCITY: 1.3 M/S
ECHO MV MEAN GRADIENT: 4 MMHG
ECHO MV MEAN VELOCITY: 0.9 M/S
ECHO MV PEAK GRADIENT: 6 MMHG
ECHO MV VTI: 36.4 CM
ECHO PV ACCELERATION TIME (AT): 134 MS
ECHO PV MAX VELOCITY: 0.9 M/S
ECHO PV PEAK GRADIENT: 4 MMHG
ECHO RIGHT VENTRICULAR SYSTOLIC PRESSURE (RVSP): 20 MMHG
ECHO RV BASAL DIMENSION: 2.8 CM
ECHO RV FREE WALL PEAK S': 12 CM/S
ECHO RV TAPSE: 2.5 CM (ref 1.7–?)
ECHO TV REGURGITANT MAX VELOCITY: 2.07 M/S
ECHO TV REGURGITANT PEAK GRADIENT: 17 MMHG
EKG ATRIAL RATE: 52 BPM
EKG DIAGNOSIS: NORMAL
EKG P AXIS: 38 DEGREES
EKG P-R INTERVAL: 140 MS
EKG Q-T INTERVAL: 458 MS
EKG QRS DURATION: 82 MS
EKG QTC CALCULATION (BAZETT): 425 MS
EKG R AXIS: 26 DEGREES
EKG T AXIS: 47 DEGREES
EKG VENTRICULAR RATE: 52 BPM
ERYTHROCYTE [DISTWIDTH] IN BLOOD BY AUTOMATED COUNT: 12.6 % (ref 11.9–14.6)
GLOBULIN SER CALC-MCNC: 2.9 G/DL (ref 2.8–4.5)
GLUCOSE SERPL-MCNC: 106 MG/DL (ref 65–100)
HCT VFR BLD AUTO: 39.2 % (ref 35.8–46.3)
HGB BLD-MCNC: 13.2 G/DL (ref 11.7–15.4)
MAGNESIUM SERPL-MCNC: 2.2 MG/DL (ref 1.8–2.4)
MCH RBC QN AUTO: 31.9 PG (ref 26.1–32.9)
MCHC RBC AUTO-ENTMCNC: 33.7 G/DL (ref 31.4–35)
MCV RBC AUTO: 94.7 FL (ref 82–102)
NRBC # BLD: 0 K/UL (ref 0–0.2)
PLATELET # BLD AUTO: 164 K/UL (ref 150–450)
PMV BLD AUTO: 11.3 FL (ref 9.4–12.3)
POTASSIUM SERPL-SCNC: 4.2 MMOL/L (ref 3.5–5.1)
PROT SERPL-MCNC: 6.7 G/DL (ref 6.3–8.2)
RBC # BLD AUTO: 4.14 M/UL (ref 4.05–5.2)
SODIUM SERPL-SCNC: 141 MMOL/L (ref 133–143)
TROPONIN I SERPL HS-MCNC: 3.3 PG/ML (ref 0–37)
TROPONIN I SERPL HS-MCNC: 4.7 PG/ML (ref 0–37)
WBC # BLD AUTO: 7.8 K/UL (ref 4.3–11.1)

## 2023-06-13 PROCEDURE — 93005 ELECTROCARDIOGRAM TRACING: CPT | Performed by: PHYSICIAN ASSISTANT

## 2023-06-13 PROCEDURE — 6370000000 HC RX 637 (ALT 250 FOR IP): Performed by: NURSE PRACTITIONER

## 2023-06-13 PROCEDURE — 2580000003 HC RX 258: Performed by: PHYSICIAN ASSISTANT

## 2023-06-13 PROCEDURE — 99204 OFFICE O/P NEW MOD 45 MIN: CPT | Performed by: INTERNAL MEDICINE

## 2023-06-13 PROCEDURE — 85027 COMPLETE CBC AUTOMATED: CPT

## 2023-06-13 PROCEDURE — 93010 ELECTROCARDIOGRAM REPORT: CPT | Performed by: INTERNAL MEDICINE

## 2023-06-13 PROCEDURE — 6370000000 HC RX 637 (ALT 250 FOR IP): Performed by: PHYSICIAN ASSISTANT

## 2023-06-13 PROCEDURE — 83735 ASSAY OF MAGNESIUM: CPT

## 2023-06-13 PROCEDURE — 84484 ASSAY OF TROPONIN QUANT: CPT

## 2023-06-13 PROCEDURE — 93306 TTE W/DOPPLER COMPLETE: CPT | Performed by: INTERNAL MEDICINE

## 2023-06-13 PROCEDURE — 80053 COMPREHEN METABOLIC PANEL: CPT

## 2023-06-13 PROCEDURE — G0378 HOSPITAL OBSERVATION PER HR: HCPCS

## 2023-06-13 PROCEDURE — 36415 COLL VENOUS BLD VENIPUNCTURE: CPT

## 2023-06-13 PROCEDURE — 99214 OFFICE O/P EST MOD 30 MIN: CPT | Performed by: INTERNAL MEDICINE

## 2023-06-13 PROCEDURE — 93306 TTE W/DOPPLER COMPLETE: CPT

## 2023-06-13 RX ORDER — ASPIRIN 81 MG/1
81 TABLET ORAL DAILY
Status: DISCONTINUED | OUTPATIENT
Start: 2023-06-14 | End: 2023-06-15 | Stop reason: HOSPADM

## 2023-06-13 RX ORDER — SODIUM CHLORIDE 9 MG/ML
INJECTION, SOLUTION INTRAVENOUS PRN
Status: DISCONTINUED | OUTPATIENT
Start: 2023-06-13 | End: 2023-06-15 | Stop reason: HOSPADM

## 2023-06-13 RX ORDER — ONDANSETRON 4 MG/1
4 TABLET, ORALLY DISINTEGRATING ORAL EVERY 8 HOURS PRN
Status: DISCONTINUED | OUTPATIENT
Start: 2023-06-13 | End: 2023-06-15 | Stop reason: HOSPADM

## 2023-06-13 RX ORDER — SODIUM CHLORIDE 0.9 % (FLUSH) 0.9 %
5-40 SYRINGE (ML) INJECTION EVERY 12 HOURS SCHEDULED
Status: DISCONTINUED | OUTPATIENT
Start: 2023-06-13 | End: 2023-06-15 | Stop reason: HOSPADM

## 2023-06-13 RX ORDER — POLYETHYLENE GLYCOL 3350 17 G/17G
17 POWDER, FOR SOLUTION ORAL DAILY PRN
Status: DISCONTINUED | OUTPATIENT
Start: 2023-06-13 | End: 2023-06-15 | Stop reason: HOSPADM

## 2023-06-13 RX ORDER — SODIUM CHLORIDE 0.9 % (FLUSH) 0.9 %
5-40 SYRINGE (ML) INJECTION PRN
Status: DISCONTINUED | OUTPATIENT
Start: 2023-06-13 | End: 2023-06-15 | Stop reason: HOSPADM

## 2023-06-13 RX ORDER — ACETAMINOPHEN 325 MG/1
650 TABLET ORAL EVERY 6 HOURS PRN
Status: DISCONTINUED | OUTPATIENT
Start: 2023-06-13 | End: 2023-06-13 | Stop reason: SDUPTHER

## 2023-06-13 RX ORDER — GUAIFENESIN/DEXTROMETHORPHAN 100-10MG/5
5 SYRUP ORAL EVERY 4 HOURS PRN
Status: DISCONTINUED | OUTPATIENT
Start: 2023-06-13 | End: 2023-06-15 | Stop reason: HOSPADM

## 2023-06-13 RX ORDER — AMLODIPINE BESYLATE 10 MG/1
10 TABLET ORAL DAILY
Status: DISCONTINUED | OUTPATIENT
Start: 2023-06-14 | End: 2023-06-15 | Stop reason: HOSPADM

## 2023-06-13 RX ORDER — ACETAMINOPHEN 325 MG/1
650 TABLET ORAL EVERY 4 HOURS PRN
Status: DISCONTINUED | OUTPATIENT
Start: 2023-06-13 | End: 2023-06-15 | Stop reason: HOSPADM

## 2023-06-13 RX ORDER — ATORVASTATIN CALCIUM 10 MG/1
10 TABLET, FILM COATED ORAL DAILY
Status: DISCONTINUED | OUTPATIENT
Start: 2023-06-14 | End: 2023-06-15

## 2023-06-13 RX ORDER — ONDANSETRON 2 MG/ML
4 INJECTION INTRAMUSCULAR; INTRAVENOUS EVERY 6 HOURS PRN
Status: DISCONTINUED | OUTPATIENT
Start: 2023-06-13 | End: 2023-06-15 | Stop reason: HOSPADM

## 2023-06-13 RX ADMIN — ACETAMINOPHEN 650 MG: 325 TABLET ORAL at 14:46

## 2023-06-13 RX ADMIN — GUAIFENESIN SYRUP AND DEXTROMETHORPHAN 5 ML: 100; 10 SYRUP ORAL at 21:58

## 2023-06-13 RX ADMIN — ACETAMINOPHEN 650 MG: 325 TABLET ORAL at 20:16

## 2023-06-13 RX ADMIN — SODIUM CHLORIDE, PRESERVATIVE FREE 10 ML: 5 INJECTION INTRAVENOUS at 20:16

## 2023-06-13 ASSESSMENT — PAIN DESCRIPTION - LOCATION
LOCATION: HEAD
LOCATION: HEAD

## 2023-06-13 ASSESSMENT — PAIN DESCRIPTION - ORIENTATION
ORIENTATION: MID
ORIENTATION: ANTERIOR

## 2023-06-13 ASSESSMENT — PAIN DESCRIPTION - PAIN TYPE: TYPE: ACUTE PAIN

## 2023-06-13 ASSESSMENT — PAIN DESCRIPTION - FREQUENCY: FREQUENCY: INTERMITTENT

## 2023-06-13 ASSESSMENT — PAIN SCALES - GENERAL
PAINLEVEL_OUTOF10: 3
PAINLEVEL_OUTOF10: 3
PAINLEVEL_OUTOF10: 0

## 2023-06-13 ASSESSMENT — PAIN - FUNCTIONAL ASSESSMENT: PAIN_FUNCTIONAL_ASSESSMENT: ACTIVITIES ARE NOT PREVENTED

## 2023-06-13 ASSESSMENT — PAIN DESCRIPTION - ONSET: ONSET: GRADUAL

## 2023-06-13 ASSESSMENT — PAIN DESCRIPTION - DESCRIPTORS
DESCRIPTORS: ACHING;SORE
DESCRIPTORS: ACHING

## 2023-06-13 NOTE — PROGRESS NOTES
Assessment completed upon patients arrival to unit and care assumed. Patient received to room 322. Patient connected to monitor and assessment completed. Patient oriented to room and call light. Patient aware to use call light to communicate any chest pain or needs. Admission skin assessment completed with second RN and reveals the following: Patient's skin is warm, dry, fragile, and color is appropriate for ethnicity. Sacrum and heels are intact. Scattered scars.

## 2023-06-13 NOTE — PLAN OF CARE
Problem: Discharge Planning  Goal: Discharge to home or other facility with appropriate resources  6/13/2023 1512 by Jose Aviles RN  Outcome: Progressing  6/13/2023 1511 by Jose Aviles RN  Outcome: Progressing  Flowsheets (Taken 6/13/2023 1415)  Discharge to home or other facility with appropriate resources: Identify barriers to discharge with patient and caregiver     Problem: Pain  Goal: Verbalizes/displays adequate comfort level or baseline comfort level  6/13/2023 1512 by Jose Aviles RN  Outcome: Progressing  6/13/2023 1511 by Jose Aviles RN  Outcome: Progressing     Problem: Safety - Adult  Goal: Free from fall injury  Outcome: Progressing

## 2023-06-13 NOTE — CARE COORDINATION
Direct admit in Obs status for symptomatic bradycardia. Planned telemetry monitoring overnight with PPI in AM. CM reviewed clinical record for CM needs. CM will remain available for new issues of needs. 06/13/23 5240   Service Assessment   Patient Orientation Alert and Oriented   Cognition Alert   History Provided By Medical Record   Primary Caregiver Self   Support Systems Spouse/Significant Other   Patient's Healthcare Decision Maker is: Legal Next of Kin   PCP Verified by CM Yes   Prior Functional Level Independent in ADLs/IADLs   Current Functional Level Independent in ADLs/IADLs   Can patient return to prior living arrangement Yes   Ability to make needs known: Good   Family able to assist with home care needs: Yes   Would you like for me to discuss the discharge plan with any other family members/significant others, and if so, who? No   Financial Resources [place] Resources None   Social/Functional History   Receives Help From Family   Mode of Transportation Car   Discharge Planning   DME Ordered? No   Potential Assistance Purchasing Medications No   Type of Home Care Services None   Services At/After Discharge   Transition of Care Consult (CM Consult) Discharge hospitals 1690 Discharge None   [place] Resource Information Provided?  No   Mode of Transport at Discharge   (Car)   Confirm Follow Up Transport Family

## 2023-06-13 NOTE — H&P
[facility] Cardiology Initial Cardiac Evaluation      Date of  Admission: 6/13/2023  1:47 PM     Primary Care Physician: Rula Glover MD  Primary Cardiologist: Dr Erik Camp Physician: Dr Lakisha Zarco    CC/Reason for evaluation: symptomatic bradycardia     HPI:  Mojgan Mensah is a 69 y.o. female with prior history of  HTN, dyslipidemia, colon cancer, sacral neurostimulator placed in 2016 who presented to [facility] as direct admit. Patient was seen by PCP today for dizziness and generalized weakness. Patient states this has been going on since Saturday. Denies any near syncope or syncope. Denies any chest pain, pressure, or tightness. No shortness of breath. While at PCP office was bradycardic with heart at 40. This was discussed with primary cardiologist who directly admitted for symptomatic bradycardia. Currently asymptomatic. Past Medical History:   Diagnosis Date    Colon cancer ([address]     HLD (hyperlipidemia)     HTN (hypertension)       No past surgical history on file.     No Known Allergies   Social History     Socioeconomic History    Marital status:      Spouse name: Not on file    Number of children: Not on file    Years of education: Not on file    Highest education level: Not on file   Occupational History    Not on file   Tobacco Use    Smoking status: Never    Smokeless tobacco: Never   Substance and Sexual Activity    Alcohol use: Not on file    Drug use: Not on file    Sexual activity: Not on file   Other Topics Concern    Not on file   Social History Narrative    Not on file     Social Determinants of Health     Financial Resource Strain: Not on file   Food Insecurity: Not on file   Transportation Needs: Not on file   Physical Activity: Not on file   Stress: Not on file   Social Connections: Not on file   Intimate Partner Violence: Not on file   Housing Stability: Not on file     Social History       Tobacco History       Smoking Status  Never      Smokeless Tobacco Use  Never [address]:Pneumonia Vaccination Status For Older Adults: Pneumococcal Vaccination Previously Received Detail Level: Detailed

## 2023-06-14 ENCOUNTER — APPOINTMENT (OUTPATIENT)
Dept: GENERAL RADIOLOGY | Age: 78
End: 2023-06-14
Attending: INTERNAL MEDICINE
Payer: MEDICARE

## 2023-06-14 LAB
ANION GAP SERPL CALC-SCNC: 6 MMOL/L (ref 2–11)
BASOPHILS # BLD: 0.1 K/UL (ref 0–0.2)
BASOPHILS NFR BLD: 1 % (ref 0–2)
BUN SERPL-MCNC: 15 MG/DL (ref 8–23)
CALCIUM SERPL-MCNC: 8.9 MG/DL (ref 8.3–10.4)
CHLORIDE SERPL-SCNC: 107 MMOL/L (ref 101–110)
CO2 SERPL-SCNC: 26 MMOL/L (ref 21–32)
CREAT SERPL-MCNC: 0.8 MG/DL (ref 0.6–1)
DIFFERENTIAL METHOD BLD: ABNORMAL
ECHO BSA: 1.68 M2
EOSINOPHIL # BLD: 0.3 K/UL (ref 0–0.8)
EOSINOPHIL NFR BLD: 4 % (ref 0.5–7.8)
ERYTHROCYTE [DISTWIDTH] IN BLOOD BY AUTOMATED COUNT: 12.5 % (ref 11.9–14.6)
GLUCOSE SERPL-MCNC: 93 MG/DL (ref 65–100)
HCT VFR BLD AUTO: 42.2 % (ref 35.8–46.3)
HGB BLD-MCNC: 14.1 G/DL (ref 11.7–15.4)
IMM GRANULOCYTES # BLD AUTO: 0 K/UL (ref 0–0.5)
IMM GRANULOCYTES NFR BLD AUTO: 0 % (ref 0–5)
LYMPHOCYTES # BLD: 1.5 K/UL (ref 0.5–4.6)
LYMPHOCYTES NFR BLD: 21 % (ref 13–44)
MCH RBC QN AUTO: 31.3 PG (ref 26.1–32.9)
MCHC RBC AUTO-ENTMCNC: 33.4 G/DL (ref 31.4–35)
MCV RBC AUTO: 93.8 FL (ref 82–102)
MONOCYTES # BLD: 1 K/UL (ref 0.1–1.3)
MONOCYTES NFR BLD: 14 % (ref 4–12)
NEUTS SEG # BLD: 4.3 K/UL (ref 1.7–8.2)
NEUTS SEG NFR BLD: 60 % (ref 43–78)
NRBC # BLD: 0 K/UL (ref 0–0.2)
PLATELET # BLD AUTO: 164 K/UL (ref 150–450)
PMV BLD AUTO: 11.2 FL (ref 9.4–12.3)
POTASSIUM SERPL-SCNC: 3.6 MMOL/L (ref 3.5–5.1)
RBC # BLD AUTO: 4.5 M/UL (ref 4.05–5.2)
SODIUM SERPL-SCNC: 139 MMOL/L (ref 133–143)
WBC # BLD AUTO: 7.2 K/UL (ref 4.3–11.1)

## 2023-06-14 PROCEDURE — 36415 COLL VENOUS BLD VENIPUNCTURE: CPT

## 2023-06-14 PROCEDURE — 6360000002 HC RX W HCPCS: Performed by: INTERNAL MEDICINE

## 2023-06-14 PROCEDURE — C1892 INTRO/SHEATH,FIXED,PEEL-AWAY: HCPCS | Performed by: INTERNAL MEDICINE

## 2023-06-14 PROCEDURE — 80048 BASIC METABOLIC PNL TOTAL CA: CPT

## 2023-06-14 PROCEDURE — 6370000000 HC RX 637 (ALT 250 FOR IP): Performed by: PHYSICIAN ASSISTANT

## 2023-06-14 PROCEDURE — 2580000003 HC RX 258: Performed by: PHYSICIAN ASSISTANT

## 2023-06-14 PROCEDURE — 2500000003 HC RX 250 WO HCPCS: Performed by: INTERNAL MEDICINE

## 2023-06-14 PROCEDURE — 71045 X-RAY EXAM CHEST 1 VIEW: CPT

## 2023-06-14 PROCEDURE — 2709999900 HC NON-CHARGEABLE SUPPLY: Performed by: INTERNAL MEDICINE

## 2023-06-14 PROCEDURE — C1894 INTRO/SHEATH, NON-LASER: HCPCS | Performed by: INTERNAL MEDICINE

## 2023-06-14 PROCEDURE — C1898 LEAD, PMKR, OTHER THAN TRANS: HCPCS | Performed by: INTERNAL MEDICINE

## 2023-06-14 PROCEDURE — L3660 SO 8 AB RSTR CAN/WEB PRE OTS: HCPCS | Performed by: INTERNAL MEDICINE

## 2023-06-14 PROCEDURE — 33208 INSRT HEART PM ATRIAL & VENT: CPT | Performed by: INTERNAL MEDICINE

## 2023-06-14 PROCEDURE — C1785 PMKR, DUAL, RATE-RESP: HCPCS | Performed by: INTERNAL MEDICINE

## 2023-06-14 PROCEDURE — 85025 COMPLETE CBC W/AUTO DIFF WBC: CPT

## 2023-06-14 PROCEDURE — 99152 MOD SED SAME PHYS/QHP 5/>YRS: CPT | Performed by: INTERNAL MEDICINE

## 2023-06-14 PROCEDURE — G0378 HOSPITAL OBSERVATION PER HR: HCPCS

## 2023-06-14 PROCEDURE — 2580000003 HC RX 258: Performed by: INTERNAL MEDICINE

## 2023-06-14 PROCEDURE — 6370000000 HC RX 637 (ALT 250 FOR IP): Performed by: NURSE PRACTITIONER

## 2023-06-14 PROCEDURE — 99153 MOD SED SAME PHYS/QHP EA: CPT | Performed by: INTERNAL MEDICINE

## 2023-06-14 DEVICE — IPG W1DR01 AZURE XT DR MRI USA
Type: IMPLANTABLE DEVICE | Status: FUNCTIONAL
Brand: AZURE™ XT DR MRI SURESCAN™

## 2023-06-14 DEVICE — LEAD PACE BPLR 6 FRX45 CM STR TIP IS-1 CAPSUR FIX NOVUS: Type: IMPLANTABLE DEVICE | Status: FUNCTIONAL

## 2023-06-14 DEVICE — LEAD 5076-52 MRI US RCMCRD
Type: IMPLANTABLE DEVICE | Status: FUNCTIONAL
Brand: CAPSUREFIX NOVUS MRI™ SURESCAN®

## 2023-06-14 RX ORDER — MIDAZOLAM HYDROCHLORIDE 1 MG/ML
INJECTION INTRAMUSCULAR; INTRAVENOUS PRN
Status: DISCONTINUED | OUTPATIENT
Start: 2023-06-14 | End: 2023-06-14 | Stop reason: HOSPADM

## 2023-06-14 RX ORDER — HYDROCODONE BITARTRATE AND ACETAMINOPHEN 7.5; 325 MG/1; MG/1
1 TABLET ORAL EVERY 6 HOURS PRN
Status: DISCONTINUED | OUTPATIENT
Start: 2023-06-14 | End: 2023-06-15 | Stop reason: HOSPADM

## 2023-06-14 RX ORDER — LIDOCAINE HYDROCHLORIDE 10 MG/ML
INJECTION, SOLUTION INFILTRATION; PERINEURAL PRN
Status: DISCONTINUED | OUTPATIENT
Start: 2023-06-14 | End: 2023-06-14 | Stop reason: HOSPADM

## 2023-06-14 RX ADMIN — SODIUM CHLORIDE, PRESERVATIVE FREE 10 ML: 5 INJECTION INTRAVENOUS at 20:35

## 2023-06-14 RX ADMIN — WATER 1000 MG: 1 INJECTION INTRAMUSCULAR; INTRAVENOUS; SUBCUTANEOUS at 17:17

## 2023-06-14 RX ADMIN — SODIUM CHLORIDE, PRESERVATIVE FREE 10 ML: 5 INJECTION INTRAVENOUS at 08:19

## 2023-06-14 RX ADMIN — AMLODIPINE BESYLATE 10 MG: 10 TABLET ORAL at 08:15

## 2023-06-14 RX ADMIN — ONDANSETRON 4 MG: 4 TABLET, ORALLY DISINTEGRATING ORAL at 12:57

## 2023-06-14 RX ADMIN — HYDROCODONE BITARTRATE AND ACETAMINOPHEN 1 TABLET: 7.5; 325 TABLET ORAL at 17:16

## 2023-06-14 RX ADMIN — ATORVASTATIN CALCIUM 10 MG: 10 TABLET, FILM COATED ORAL at 08:15

## 2023-06-14 RX ADMIN — ASPIRIN 81 MG: 81 TABLET ORAL at 08:15

## 2023-06-14 ASSESSMENT — PAIN DESCRIPTION - ORIENTATION: ORIENTATION: LEFT

## 2023-06-14 ASSESSMENT — PAIN SCALES - GENERAL
PAINLEVEL_OUTOF10: 0
PAINLEVEL_OUTOF10: 8
PAINLEVEL_OUTOF10: 0
PAINLEVEL_OUTOF10: 0

## 2023-06-14 ASSESSMENT — PAIN DESCRIPTION - LOCATION: LOCATION: CHEST

## 2023-06-14 ASSESSMENT — PAIN DESCRIPTION - DESCRIPTORS: DESCRIPTORS: ACHING;SORE

## 2023-06-14 NOTE — CARE COORDINATION
CM met with patient for updated assessment after PPI. Sister at bedside. No needs identified. CM will remain available for discharge needs.

## 2023-06-14 NOTE — PROGRESS NOTES
TRANSFER - IN REPORT:    Verbal report received from Ira RN on Brian Curry  being received from cath lab for routine progression of patient care      Report consisted of patient's Situation, Background, Assessment and   Recommendations(SBAR). Information from the following report(s) Nurse Handoff Report was reviewed with the receiving nurse. Opportunity for questions and clarification was provided. Assessment completed upon patient's arrival to unit and care assumed.

## 2023-06-14 NOTE — PROGRESS NOTES
TRANSFER - OUT REPORT:    Verbal report given to Lavern RN on Jeison Casiano  being transferred to [facility]  for routine progression of patient care       Report consisted of patient's Situation, Background, Assessment and   Recommendations(SBAR). Information from the following report(s) Nurse Handoff Report was reviewed with the receiving nurse. Lines:   Peripheral IV 06/13/23 Distal;Left; Anterior Cephalic (Active)   Site Assessment Clean, dry & intact 06/14/23 0819   Line Status Flushed;Capped;Normal saline locked 06/14/23 0819   Line Care Connections checked and tightened;Cap changed 06/14/23 0819   Phlebitis Assessment No symptoms 06/14/23 0819   Infiltration Assessment 0 06/14/23 0819   Alcohol Cap Used Yes 06/14/23 0819   Dressing Status Clean, dry & intact 06/14/23 0819   Dressing Type Transparent 06/14/23 0819        Opportunity for questions and clarification was provided.       Patient transported with:  Gingerd

## 2023-06-14 NOTE — PROGRESS NOTES
TRANSFER - IN REPORT:    [facility] on Lawanda Brewer being received from [address] [place] [address] for routine progression of care. Report consisted of patients Situation, Background, Assessment and Recommendations(SBAR). Information from the following report(s) SBAR, Kardex, Procedure Summary, Intake/Output, MAR, and Recent Results was reviewed. Opportunity for questions and clarification was provided. Assessment completed upon patients arrival to unit and care assumed. Patient received to room 322. Patient connected to monitor and assessment completed. Plan of care reviewed. Patient oriented to room and call light. Patient aware to use call light to communicate any chest pain or needs.

## 2023-06-14 NOTE — PROGRESS NOTES
TRANSFER - OUT REPORT:    Verbal report given to RN on Sabrina Alonso  being transferred to [facility] for routine progression of patient care       Report consisted of patient's Situation, Background, Assessment and   Recommendations(SBAR). Information from the following report(s) Nurse Handoff Report and MAR was reviewed with the receiving nurse.       Dual Chamber Permanent Pacemaker w/ Dr. Walt Higginbotham- DDDR   Left chest placement  Sutures, dermabond and aquacel  Pt placed in arm sling    Ancef 2g pre-procedure     Versed 8mg IV  Dilaudid 1mg IV

## 2023-06-14 NOTE — PLAN OF CARE
Problem: Discharge Planning  Goal: Discharge to home or other facility with appropriate resources  Outcome: Progressing  Flowsheets (Taken 6/14/2023 0819)  Discharge to home or other facility with appropriate resources: Identify barriers to discharge with patient and caregiver     Problem: Pain  Goal: Verbalizes/displays adequate comfort level or baseline comfort level  Outcome: Progressing     Problem: Safety - Adult  Goal: Free from fall injury  Outcome: Progressing  Flowsheets (Taken 6/14/2023 0819)  Free From Fall Injury: Instruct family/caregiver on patient safety

## 2023-06-15 VITALS
HEART RATE: 63 BPM | RESPIRATION RATE: 17 BRPM | OXYGEN SATURATION: 92 % | DIASTOLIC BLOOD PRESSURE: 79 MMHG | WEIGHT: 142 LBS | HEIGHT: 60 IN | BODY MASS INDEX: 27.88 KG/M2 | SYSTOLIC BLOOD PRESSURE: 147 MMHG | TEMPERATURE: 98.8 F

## 2023-06-15 LAB
ANION GAP SERPL CALC-SCNC: 6 MMOL/L (ref 2–11)
BUN SERPL-MCNC: 16 MG/DL (ref 8–23)
CALCIUM SERPL-MCNC: 9.1 MG/DL (ref 8.3–10.4)
CHLORIDE SERPL-SCNC: 105 MMOL/L (ref 101–110)
CO2 SERPL-SCNC: 24 MMOL/L (ref 21–32)
CREAT SERPL-MCNC: 0.6 MG/DL (ref 0.6–1)
GLUCOSE SERPL-MCNC: 101 MG/DL (ref 65–100)
POTASSIUM SERPL-SCNC: 4.1 MMOL/L (ref 3.5–5.1)
SODIUM SERPL-SCNC: 135 MMOL/L (ref 133–143)

## 2023-06-15 PROCEDURE — G0378 HOSPITAL OBSERVATION PER HR: HCPCS

## 2023-06-15 PROCEDURE — 93280 PM DEVICE PROGR EVAL DUAL: CPT | Performed by: INTERNAL MEDICINE

## 2023-06-15 PROCEDURE — 2580000003 HC RX 258: Performed by: INTERNAL MEDICINE

## 2023-06-15 PROCEDURE — 6370000000 HC RX 637 (ALT 250 FOR IP): Performed by: INTERNAL MEDICINE

## 2023-06-15 PROCEDURE — 6370000000 HC RX 637 (ALT 250 FOR IP): Performed by: PHYSICIAN ASSISTANT

## 2023-06-15 PROCEDURE — 80048 BASIC METABOLIC PNL TOTAL CA: CPT

## 2023-06-15 PROCEDURE — 99024 POSTOP FOLLOW-UP VISIT: CPT | Performed by: INTERNAL MEDICINE

## 2023-06-15 PROCEDURE — 6370000000 HC RX 637 (ALT 250 FOR IP): Performed by: NURSE PRACTITIONER

## 2023-06-15 PROCEDURE — 36415 COLL VENOUS BLD VENIPUNCTURE: CPT

## 2023-06-15 PROCEDURE — 6360000002 HC RX W HCPCS: Performed by: INTERNAL MEDICINE

## 2023-06-15 RX ORDER — ATORVASTATIN CALCIUM 20 MG/1
20 TABLET, FILM COATED ORAL DAILY
Qty: 90 TABLET | Refills: 3 | Status: SHIPPED | OUTPATIENT
Start: 2023-06-15

## 2023-06-15 RX ORDER — ATORVASTATIN CALCIUM 20 MG/1
20 TABLET, FILM COATED ORAL DAILY
Qty: 90 TABLET | Refills: 3 | Status: SHIPPED | OUTPATIENT
Start: 2023-06-15 | End: 2023-06-15 | Stop reason: SDUPTHER

## 2023-06-15 RX ORDER — ATORVASTATIN CALCIUM 20 MG/1
20 TABLET, FILM COATED ORAL DAILY
Status: DISCONTINUED | OUTPATIENT
Start: 2023-06-15 | End: 2023-06-15 | Stop reason: HOSPADM

## 2023-06-15 RX ADMIN — WATER 1000 MG: 1 INJECTION INTRAMUSCULAR; INTRAVENOUS; SUBCUTANEOUS at 01:56

## 2023-06-15 RX ADMIN — AMLODIPINE BESYLATE 10 MG: 10 TABLET ORAL at 08:07

## 2023-06-15 RX ADMIN — ATORVASTATIN CALCIUM 20 MG: 20 TABLET, FILM COATED ORAL at 08:07

## 2023-06-15 RX ADMIN — ONDANSETRON 4 MG: 4 TABLET, ORALLY DISINTEGRATING ORAL at 08:55

## 2023-06-15 RX ADMIN — ASPIRIN 81 MG: 81 TABLET ORAL at 08:07

## 2023-06-15 RX ADMIN — HYDROCODONE BITARTRATE AND ACETAMINOPHEN 1 TABLET: 7.5; 325 TABLET ORAL at 04:48

## 2023-06-15 ASSESSMENT — PAIN SCALES - GENERAL
PAINLEVEL_OUTOF10: 6
PAINLEVEL_OUTOF10: 6
PAINLEVEL_OUTOF10: 0

## 2023-06-15 ASSESSMENT — PAIN DESCRIPTION - ONSET: ONSET: GRADUAL

## 2023-06-15 ASSESSMENT — PAIN DESCRIPTION - DESCRIPTORS: DESCRIPTORS: ACHING;SORE

## 2023-06-15 ASSESSMENT — PAIN DESCRIPTION - ORIENTATION: ORIENTATION: LEFT

## 2023-06-15 ASSESSMENT — PAIN DESCRIPTION - PAIN TYPE: TYPE: SURGICAL PAIN

## 2023-06-15 ASSESSMENT — PAIN - FUNCTIONAL ASSESSMENT: PAIN_FUNCTIONAL_ASSESSMENT: ACTIVITIES ARE NOT PREVENTED

## 2023-06-15 ASSESSMENT — PAIN DESCRIPTION - LOCATION: LOCATION: INCISION;SHOULDER

## 2023-06-15 ASSESSMENT — PAIN DESCRIPTION - FREQUENCY: FREQUENCY: INTERMITTENT

## 2023-06-15 NOTE — DISCHARGE SUMMARY
[facility] Cardiology Discharge Summary     Patient ID:  Key Darden  [id number]  27 y.o.  1945    Admit date: 6/13/2023    Discharge date:  6/15/2023    Admitting Physician: Mathew yLons MD     Discharge Physician: Dr. Angelique Ferguson    Admission Diagnoses: Symptomatic bradycardia [R00.1]    Discharge Diagnoses:   Patient Active Problem List    Diagnosis    Symptomatic bradycardia    Generalized weakness    Primary hypertension    Dyslipidemia    LVH (left ventricular hypertrophy)       Cardiology Procedures this admission:  EchoCardiogram  Pacemaker implantation, CXR  Consults: none    Hospital Course: Patient direct admitted to telemetry with complaints of dizziness and generalized weakness and bradycardia. Patient was noted to be bradycardic while at PCP. Echo showed:      Left Ventricle: Normal left ventricular systolic function with a visually estimated EF of 60 - 65%. Left ventricle size is normal. Mildly increased wall thickness. Normal wall motion. Normal diastolic function. Aortic Valve: Mild sclerosis of the aortic valve cusp. Mitral Valve: Mildly calcified leaflet. Mild regurgitation. Tricuspid Valve: Mild regurgitation. The estimated RVSP is 20 mmHg. Patient was taken to the cath lab and underwent successful implantation of [employer]onic dual chamber PM by Dr. Aaron Couch on 6/14/2020. Patient tolerated the procedure well and was taken to the telemetry floor for recovery. Follow up chest xray showed no pneumothorax. The following morning patient was up feeling well without any complaints of chest pain, shortness of breath, or palpitations. Patient's left subclavian cath site was clean, dry and intact without hematoma. Patient's labs were WNL. Patient was seen and examined by Dr. Angelique Ferguson and determined stable and ready for discharge. Patient was instructed on the importance of medication compliance and outpatient follow up.  Patient has been scheduled for follow-up with [facility]

## 2023-06-15 NOTE — PROGRESS NOTES
Discharge instructions reviewed with patient. Prescriptions given for lipitor and med info sheets provided for all new medications. Opportunity for questions provided. Patient voiced understanding of all discharge instructions.

## 2023-06-15 NOTE — DISCHARGE INSTRUCTIONS
DEVICE IMPLANT FOLLOWUP INSTRUCTIONS  You will be discharged with an occlusive dressing over the incision site. This dressing will be removed at the 10 -14-day postop site check with the [address]. Your new device will be checked at that visit and all your device teaching will be given. Keep the incision site dry until your follow up. Use a hand-held shower or bath. Do not submerge or soak in pools or tubs for 6 weeks. If you are discharged with a prescription for antibiotics, please take the full prescription until it is gone. After your site check, you may shower and get the incision site wet. You may let soap and water run on the incision and pat dry. Please do not apply creams, lotions or powders on or near the incision. Mild bruising and swelling can be normal after implant and will resolve in a few weeks. Call the office at [phone] if you have any of the following:  -Signs of infection, such as fever over 100 F, drainage from the incision, redness, significant swelling, or warmth at the incision site.  -Significant pain around the site that gets worse. Mild discomfort can be normal.   -Bleeding from the incision site  -Swelling in the arm on the side of the incision site  -Chest pain or shortness of breath     Your device has the capability of transmitting device information from home to our office using a home monitoring system or phone nahum. The information will transmit through a cellular connection outside of your home. Your device data is reviewed during working hours to ensure proper device function. You will be given your equipment and instruction upon your hospital discharge.                                                                       -You will be given a sling to wear upon discharge with instructions when it should be worn.    -Do not lift the affected arm above the shoulder level, on the side the device was put

## 2023-06-15 NOTE — PROGRESS NOTES
[facility] CARDIOLOGY PROGRESS NOTE    6/15/2023 7:25 AM    Admit Date: 6/13/2023        Subjective:   Stable overnight without angina, CHF, or palpitations. Vitals stable and controlled. No other complaints overnight. Tolerating meds well. Objective:      Vitals:    06/14/23 1716 06/14/23 2011 06/15/23 0019 06/15/23 0448   BP:  131/82 132/68 131/76   Pulse:  75 68 61   Resp: 16 17 18 20   Temp:  98.6 °F (37 °C) 98.4 °F (36.9 °C) 98.6 °F (37 °C)   TempSrc:  Temporal Temporal Temporal   SpO2:  93% 97% 96%   Weight:    142 lb (64.4 kg)   Height:           Physical Exam:  Neck- supple, no JVD  CV- regular rate and rhythm no MRG  Lung- clear bilaterally, mildly decreased bibasilar but no crackles or wheezing  Abd- soft, nontender, nondistended  Ext- no edema, pacer site left chest clean and dry. Dressing intact. Small hematoma, mildly tender. No drainage. Skin- warm and dry    Data Review:   Pertinent lab and noninvasive imaging over the past 24 hours reviewed and evaluated. Recent Labs     06/13/23  1437 06/14/23  0459 06/15/23  0415    139 135   K 4.2 3.6 4.1   MG 2.2  --   --    BUN 18 15 16   WBC 7.8 7.2  --    HGB 13.2 14.1  --    HCT 39.2 42.2  --     164  --      Patient is being seen today within a 90-day global period, but is being seen for a separately identifiable E/M service and is not related to the post-operative care of the procedure. Hypertension and dyslipidemia meds adjusted for discharge today after pacemaker. Assessment and Plan: Active Hospital Problems    *Symptomatic bradycardia-resolved, status post pacemaker. Site looks good. Await interrogation today. Generalized weakness-improved, follow-up as outpatient. Primary hypertension-continue amlodipine at current dose with outpatient follow-up      Dyslipidemia-stop simvastatin and start atorvastatin 20 mg daily since on amlodipine. Check lipid and liver in 3 months.     If interrogation looks good this

## 2023-06-15 NOTE — CARE COORDINATION
Patient with discharge order this AM. Patient c/o nausea but has asked for antiemetic and is agreeable to discharge. No new CM needs identified. Disposition: Home with family assistance.

## 2023-06-28 ENCOUNTER — NURSE ONLY (OUTPATIENT)
Age: 78
End: 2023-06-28
Payer: MEDICARE

## 2023-06-28 DIAGNOSIS — R00.1 SYMPTOMATIC BRADYCARDIA: Primary | ICD-10-CM

## 2023-06-28 DIAGNOSIS — Z95.0 CARDIAC PACEMAKER: ICD-10-CM

## 2023-06-28 PROCEDURE — 93288 INTERROG EVL PM/LDLS PM IP: CPT | Performed by: INTERNAL MEDICINE

## 2023-09-22 ENCOUNTER — NURSE ONLY (OUTPATIENT)
Age: 78
End: 2023-09-22

## 2023-09-22 DIAGNOSIS — R00.1 SYMPTOMATIC BRADYCARDIA: Primary | ICD-10-CM

## 2023-10-04 PROCEDURE — 93294 REM INTERROG EVL PM/LDLS PM: CPT | Performed by: INTERNAL MEDICINE

## 2023-10-04 PROCEDURE — 93296 REM INTERROG EVL PM/IDS: CPT | Performed by: INTERNAL MEDICINE

## 2023-10-15 ASSESSMENT — ENCOUNTER SYMPTOMS
CONSTIPATION: 0
DIARRHEA: 0
SHORTNESS OF BREATH: 0
COUGH: 1
SORE THROAT: 0
ABDOMINAL DISTENTION: 0
PHOTOPHOBIA: 0
ABDOMINAL PAIN: 0

## 2023-10-15 NOTE — PROGRESS NOTES
[facility] CARDIOLOGY  [address] [place] [address]  PHONE: [phone]        NAME:  Duncan Adams  : 1945  MRN: [id number]     PCP:  Kindra Marrero MD      SUBJECTIVE:   Duncan Adams is a 69 y.o. female seen for a follow up visit regarding the following:     Chief Complaint   Patient presents with    Hypertension       HPI:    She presented recently to establish new cardiac care with known HTN and dyslipidemia, and LVH by history. Doing well since last visit with him without interval angina, CHF, edema, presyncope or syncope. She has sporadic occasional palpitations lasting up to 2-3 minutes at a time without associated symptoms. Vitals controlled and tolerating meds well. Staying active without any significant limitations. Echo 2022:  Left Ventricle Normal left ventricular systolic function with a visually estimated EF of 60 - 65%. Left ventricle size is normal. Normal wall thickness. Normal wall motion. Normal diastolic function. Left Atrium Left atrium size is normal. LA Vol Index is  30 ml/m2. Right Ventricle Right ventricle size is normal. Normal wall thickness. Normal systolic function. TAPSE is 1.8 cm. Right Atrium Right atrium size is normal.   Aortic Valve Mild sclerosis of the aortic valve cusp. No regurgitation. No stenosis. Mitral Valve Valve structure is normal. Mild annular calcification of the mitral valve. Mild regurgitation. No stenosis noted. Tricuspid Valve Valve structure is normal. Trace regurgitation. No stenosis noted. Unable to assess RVSP due to inadequate or insignificant tricuspid regurgitation. Pulmonic Valve Valve structure is normal. Trace regurgitation. No stenosis noted. Aorta Normal sized annulus, sinus of Valsalva, aortic root and ascending aorta.    IVC/Hepatic Veins IVC diameter is less than or equal to 21 mm and decreases greater than 50% during inspiration; therefore the estimated right

## 2023-10-18 ENCOUNTER — OFFICE VISIT (OUTPATIENT)
Age: 78
End: 2023-10-18
Payer: MEDICARE

## 2023-10-18 VITALS
DIASTOLIC BLOOD PRESSURE: 90 MMHG | HEART RATE: 80 BPM | HEIGHT: 60 IN | BODY MASS INDEX: 29.25 KG/M2 | SYSTOLIC BLOOD PRESSURE: 150 MMHG | WEIGHT: 149 LBS

## 2023-10-18 DIAGNOSIS — I51.7 LVH (LEFT VENTRICULAR HYPERTROPHY): Primary | ICD-10-CM

## 2023-10-18 DIAGNOSIS — E78.5 DYSLIPIDEMIA: ICD-10-CM

## 2023-10-18 DIAGNOSIS — I10 PRIMARY HYPERTENSION: ICD-10-CM

## 2023-10-18 DIAGNOSIS — R00.1 SYMPTOMATIC BRADYCARDIA: ICD-10-CM

## 2023-10-18 PROCEDURE — 1090F PRES/ABSN URINE INCON ASSESS: CPT | Performed by: INTERNAL MEDICINE

## 2023-10-18 PROCEDURE — G8400 PT W/DXA NO RESULTS DOC: HCPCS | Performed by: INTERNAL MEDICINE

## 2023-10-18 PROCEDURE — 99214 OFFICE O/P EST MOD 30 MIN: CPT | Performed by: INTERNAL MEDICINE

## 2023-10-18 PROCEDURE — 1123F ACP DISCUSS/DSCN MKR DOCD: CPT | Performed by: INTERNAL MEDICINE

## 2023-10-18 PROCEDURE — G8484 FLU IMMUNIZE NO ADMIN: HCPCS | Performed by: INTERNAL MEDICINE

## 2023-10-18 PROCEDURE — G8427 DOCREV CUR MEDS BY ELIG CLIN: HCPCS | Performed by: INTERNAL MEDICINE

## 2023-10-18 PROCEDURE — G8417 CALC BMI ABV UP PARAM F/U: HCPCS | Performed by: INTERNAL MEDICINE

## 2023-10-18 PROCEDURE — 3080F DIAST BP >= 90 MM HG: CPT | Performed by: INTERNAL MEDICINE

## 2023-10-18 PROCEDURE — 1036F TOBACCO NON-USER: CPT | Performed by: INTERNAL MEDICINE

## 2023-10-18 PROCEDURE — 3077F SYST BP >= 140 MM HG: CPT | Performed by: INTERNAL MEDICINE

## 2023-10-18 RX ORDER — OMEPRAZOLE 20 MG/1
20 CAPSULE, DELAYED RELEASE ORAL DAILY
COMMUNITY

## 2024-04-02 PROCEDURE — 93294 REM INTERROG EVL PM/LDLS PM: CPT | Performed by: INTERNAL MEDICINE

## 2024-04-02 PROCEDURE — 93296 REM INTERROG EVL PM/IDS: CPT | Performed by: INTERNAL MEDICINE

## 2024-04-19 NOTE — PROGRESS NOTES
[facility] CARDIOLOGY  [address] [place] [address], [address]  [place] [address]  PHONE: [phone]        NAME:  Hamida Mcbride  : 1945  MRN: [id number]     PCP:  Reji Travis MD      SUBJECTIVE:   Hamida Mcbride is a 78 y.o. female seen for a follow up visit regarding the following:     Chief Complaint   Patient presents with    Hypertension       HPI:    She presented recently to establish new cardiac care with known HTN and dyslipidemia, and LVH by history.  Doing well since last visit with him without interval angina, CHF, edema, presyncope or syncope.  She has sporadic occasional palpitations lasting up to 2-3 minutes at a time without associated symptoms.  Vitals controlled and tolerating meds well. Staying active without any significant limitations.       Echo 2022:  Left Ventricle Normal left ventricular systolic function with a visually estimated EF of 60 - 65%. Left ventricle size is normal. Normal wall thickness. Normal wall motion. Normal diastolic function.   Left Atrium Left atrium size is normal. LA Vol Index is  30 ml/m2.   Right Ventricle Right ventricle size is normal. Normal wall thickness. Normal systolic function. TAPSE is 1.8 cm.   Right Atrium Right atrium size is normal.   Aortic Valve Mild sclerosis of the aortic valve cusp. No regurgitation. No stenosis.   Mitral Valve Valve structure is normal. Mild annular calcification of the mitral valve. Mild regurgitation. No stenosis noted.   Tricuspid Valve Valve structure is normal. Trace regurgitation. No stenosis noted. Unable to assess RVSP due to inadequate or insignificant tricuspid regurgitation.   Pulmonic Valve Valve structure is normal. Trace regurgitation. No stenosis noted.   Aorta Normal sized annulus, sinus of Valsalva, aortic root and ascending aorta.   IVC/Hepatic Veins IVC diameter is less than or equal to 21 mm and decreases greater than 50% during inspiration; therefore the estimated right

## 2024-04-23 ENCOUNTER — OFFICE VISIT (OUTPATIENT)
Age: 79
End: 2024-04-23
Payer: MEDICARE

## 2024-04-23 VITALS
SYSTOLIC BLOOD PRESSURE: 132 MMHG | HEART RATE: 78 BPM | WEIGHT: 151 LBS | HEIGHT: 60 IN | BODY MASS INDEX: 29.64 KG/M2 | DIASTOLIC BLOOD PRESSURE: 88 MMHG

## 2024-04-23 DIAGNOSIS — I10 PRIMARY HYPERTENSION: ICD-10-CM

## 2024-04-23 DIAGNOSIS — I51.7 LVH (LEFT VENTRICULAR HYPERTROPHY): Primary | ICD-10-CM

## 2024-04-23 DIAGNOSIS — E78.5 DYSLIPIDEMIA: ICD-10-CM

## 2024-04-23 DIAGNOSIS — R00.1 SYMPTOMATIC BRADYCARDIA: ICD-10-CM

## 2024-04-23 PROCEDURE — 99214 OFFICE O/P EST MOD 30 MIN: CPT | Performed by: INTERNAL MEDICINE

## 2024-04-23 PROCEDURE — G8417 CALC BMI ABV UP PARAM F/U: HCPCS | Performed by: INTERNAL MEDICINE

## 2024-04-23 PROCEDURE — 1090F PRES/ABSN URINE INCON ASSESS: CPT | Performed by: INTERNAL MEDICINE

## 2024-04-23 PROCEDURE — 3075F SYST BP GE 130 - 139MM HG: CPT | Performed by: INTERNAL MEDICINE

## 2024-04-23 PROCEDURE — G8427 DOCREV CUR MEDS BY ELIG CLIN: HCPCS | Performed by: INTERNAL MEDICINE

## 2024-04-23 PROCEDURE — 3079F DIAST BP 80-89 MM HG: CPT | Performed by: INTERNAL MEDICINE

## 2024-04-23 PROCEDURE — G8400 PT W/DXA NO RESULTS DOC: HCPCS | Performed by: INTERNAL MEDICINE

## 2024-04-23 PROCEDURE — 1123F ACP DISCUSS/DSCN MKR DOCD: CPT | Performed by: INTERNAL MEDICINE

## 2024-04-23 PROCEDURE — 1036F TOBACCO NON-USER: CPT | Performed by: INTERNAL MEDICINE

## 2024-04-23 RX ORDER — CLORAZEPATE DIPOTASSIUM 3.75 MG/1
TABLET ORAL
COMMUNITY
Start: 2024-02-29

## 2024-10-28 ENCOUNTER — OFFICE VISIT (OUTPATIENT)
Age: 79
End: 2024-10-28
Payer: MEDICARE

## 2024-10-28 VITALS
WEIGHT: 145.6 LBS | HEART RATE: 68 BPM | DIASTOLIC BLOOD PRESSURE: 88 MMHG | BODY MASS INDEX: 28.58 KG/M2 | SYSTOLIC BLOOD PRESSURE: 138 MMHG | HEIGHT: 60 IN

## 2024-10-28 DIAGNOSIS — E78.5 DYSLIPIDEMIA: ICD-10-CM

## 2024-10-28 DIAGNOSIS — R00.1 SYMPTOMATIC BRADYCARDIA: Primary | ICD-10-CM

## 2024-10-28 DIAGNOSIS — I10 PRIMARY HYPERTENSION: ICD-10-CM

## 2024-10-28 DIAGNOSIS — I51.7 LVH (LEFT VENTRICULAR HYPERTROPHY): ICD-10-CM

## 2024-10-28 PROCEDURE — 3079F DIAST BP 80-89 MM HG: CPT | Performed by: INTERNAL MEDICINE

## 2024-10-28 PROCEDURE — 1160F RVW MEDS BY RX/DR IN RCRD: CPT | Performed by: INTERNAL MEDICINE

## 2024-10-28 PROCEDURE — 3075F SYST BP GE 130 - 139MM HG: CPT | Performed by: INTERNAL MEDICINE

## 2024-10-28 PROCEDURE — 1036F TOBACCO NON-USER: CPT | Performed by: INTERNAL MEDICINE

## 2024-10-28 PROCEDURE — G8417 CALC BMI ABV UP PARAM F/U: HCPCS | Performed by: INTERNAL MEDICINE

## 2024-10-28 PROCEDURE — G8427 DOCREV CUR MEDS BY ELIG CLIN: HCPCS | Performed by: INTERNAL MEDICINE

## 2024-10-28 PROCEDURE — 1126F AMNT PAIN NOTED NONE PRSNT: CPT | Performed by: INTERNAL MEDICINE

## 2024-10-28 PROCEDURE — 99214 OFFICE O/P EST MOD 30 MIN: CPT | Performed by: INTERNAL MEDICINE

## 2024-10-28 PROCEDURE — G8400 PT W/DXA NO RESULTS DOC: HCPCS | Performed by: INTERNAL MEDICINE

## 2024-10-28 PROCEDURE — 1159F MED LIST DOCD IN RCRD: CPT | Performed by: INTERNAL MEDICINE

## 2024-10-28 PROCEDURE — 1090F PRES/ABSN URINE INCON ASSESS: CPT | Performed by: INTERNAL MEDICINE

## 2024-10-28 PROCEDURE — 93000 ELECTROCARDIOGRAM COMPLETE: CPT | Performed by: INTERNAL MEDICINE

## 2024-10-28 PROCEDURE — 1123F ACP DISCUSS/DSCN MKR DOCD: CPT | Performed by: INTERNAL MEDICINE

## 2024-10-28 PROCEDURE — G8484 FLU IMMUNIZE NO ADMIN: HCPCS | Performed by: INTERNAL MEDICINE

## 2024-10-28 ASSESSMENT — ENCOUNTER SYMPTOMS: COUGH: 0

## 2024-11-14 ENCOUNTER — NURSE ONLY (OUTPATIENT)
Age: 79
End: 2024-11-14

## 2024-11-14 DIAGNOSIS — R00.1 SYMPTOMATIC BRADYCARDIA: Primary | ICD-10-CM

## 2025-04-26 NOTE — PROGRESS NOTES
[facility] CARDIOLOGY  [address] [place] [address], [address]  [place] [address]  PHONE: [phone]        NAME:  Hamida Mcbride  : 1945  MRN: [id number]     PCP:  Lauren Tejeda DO      SUBJECTIVE:   Hamida Mcbride is a 79 y.o. female seen for a follow up visit regarding the following:     Chief Complaint   Patient presents with    Symptomatic bradycardia    LVH (left ventricular hypertrophy)       HPI:    She presented recently to establish new cardiac care with known HTN and dyslipidemia, and LVH by history.  Doing well since last visit without interval angina, CHF, edema, presyncope or syncope.  She has sporadic occasional palpitations lasting up to 2-3 minutes at a time without associated symptoms, but none whatsoever recently.  Vitals controlled and tolerating meds well. Staying active without any significant limitations.   Walking every day for exercise without limitations or exertional symptoms.     Echo 2022:  Left Ventricle Normal left ventricular systolic function with a visually estimated EF of 60 - 65%. Left ventricle size is normal. Normal wall thickness. Normal wall motion. Normal diastolic function.   Left Atrium Left atrium size is normal. LA Vol Index is  30 ml/m2.   Right Ventricle Right ventricle size is normal. Normal wall thickness. Normal systolic function. TAPSE is 1.8 cm.   Right Atrium Right atrium size is normal.   Aortic Valve Mild sclerosis of the aortic valve cusp. No regurgitation. No stenosis.   Mitral Valve Valve structure is normal. Mild annular calcification of the mitral valve. Mild regurgitation. No stenosis noted.   Tricuspid Valve Valve structure is normal. Trace regurgitation. No stenosis noted. Unable to assess RVSP due to inadequate or insignificant tricuspid regurgitation.   Pulmonic Valve Valve structure is normal. Trace regurgitation. No stenosis noted.   Aorta Normal sized annulus, sinus of Valsalva, aortic root and ascending aorta.

## 2025-04-29 ENCOUNTER — OFFICE VISIT (OUTPATIENT)
Age: 80
End: 2025-04-29
Payer: MEDICARE

## 2025-04-29 VITALS
DIASTOLIC BLOOD PRESSURE: 86 MMHG | HEART RATE: 80 BPM | WEIGHT: 143 LBS | BODY MASS INDEX: 28.07 KG/M2 | HEIGHT: 60 IN | SYSTOLIC BLOOD PRESSURE: 138 MMHG

## 2025-04-29 DIAGNOSIS — R00.1 SYMPTOMATIC BRADYCARDIA: ICD-10-CM

## 2025-04-29 DIAGNOSIS — I51.7 LVH (LEFT VENTRICULAR HYPERTROPHY): ICD-10-CM

## 2025-04-29 DIAGNOSIS — E78.5 DYSLIPIDEMIA: ICD-10-CM

## 2025-04-29 DIAGNOSIS — I10 PRIMARY HYPERTENSION: Primary | ICD-10-CM

## 2025-04-29 PROCEDURE — 1160F RVW MEDS BY RX/DR IN RCRD: CPT | Performed by: INTERNAL MEDICINE

## 2025-04-29 PROCEDURE — 99214 OFFICE O/P EST MOD 30 MIN: CPT | Performed by: INTERNAL MEDICINE

## 2025-04-29 PROCEDURE — 1123F ACP DISCUSS/DSCN MKR DOCD: CPT | Performed by: INTERNAL MEDICINE

## 2025-04-29 PROCEDURE — 1090F PRES/ABSN URINE INCON ASSESS: CPT | Performed by: INTERNAL MEDICINE

## 2025-04-29 PROCEDURE — 1159F MED LIST DOCD IN RCRD: CPT | Performed by: INTERNAL MEDICINE

## 2025-04-29 PROCEDURE — 3079F DIAST BP 80-89 MM HG: CPT | Performed by: INTERNAL MEDICINE

## 2025-04-29 PROCEDURE — G8417 CALC BMI ABV UP PARAM F/U: HCPCS | Performed by: INTERNAL MEDICINE

## 2025-04-29 PROCEDURE — G8400 PT W/DXA NO RESULTS DOC: HCPCS | Performed by: INTERNAL MEDICINE

## 2025-04-29 PROCEDURE — G8427 DOCREV CUR MEDS BY ELIG CLIN: HCPCS | Performed by: INTERNAL MEDICINE

## 2025-04-29 PROCEDURE — 3075F SYST BP GE 130 - 139MM HG: CPT | Performed by: INTERNAL MEDICINE

## 2025-04-29 PROCEDURE — 1036F TOBACCO NON-USER: CPT | Performed by: INTERNAL MEDICINE

## 2025-04-29 PROCEDURE — 1126F AMNT PAIN NOTED NONE PRSNT: CPT | Performed by: INTERNAL MEDICINE

## 2025-04-29 RX ORDER — ALENDRONATE SODIUM 70 MG
70 TABLET ORAL
COMMUNITY
Start: 2025-03-21

## 2025-04-29 RX ORDER — MELOXICAM 15 MG/1
15 TABLET ORAL DAILY
COMMUNITY
Start: 2025-02-11

## 2025-04-29 RX ORDER — AMLODIPINE BESYLATE 5 MG/1
5 TABLET ORAL DAILY
COMMUNITY
Start: 2025-04-24

## 2025-04-29 RX ORDER — PREGABALIN 25 MG/1
25 CAPSULE ORAL DAILY
COMMUNITY
Start: 2025-04-28

## (undated) DEVICE — DRESSING POSTOP AG PRISMASEAL 3.5X6IN

## (undated) DEVICE — IMMOBILIZER SHLDR M L8X16.5IN R/L CANVS W/ WAIST STRP THMB

## (undated) DEVICE — ADHESIVE SKIN CLSR 0.7ML TOP DERMBND ADV

## (undated) DEVICE — INTRODUCER SHTH L13CM OD7FR SH ORNG HUB SEAMLESS SAFSHTH

## (undated) DEVICE — SUTURE VCRL SZ 3-0 L27IN ABSRB UD L36MM CT-1 1/2 CIR J258H

## (undated) DEVICE — GUIDE NDL ST W/ 14 X 147CM TELESCOPICALLY FLD CIV FLX CVR

## (undated) DEVICE — MICROPUNCTURE INTRODUCER SET SILHOUETTE TRANSITIONLESS WITH NITINOL WIRE GUIDE: Brand: MICROPUNCTURE

## (undated) DEVICE — SUTURE VCRL + SZ 4-0 L27IN ABSRB UD PS-2 3/8 CIR REV CUT VCP426H

## (undated) DEVICE — SUTURE TICRON SZ 0 L36IN NONABSORBABLE BLU CV 300 L35MM 1 2 8886339361